# Patient Record
Sex: FEMALE | Race: WHITE
[De-identification: names, ages, dates, MRNs, and addresses within clinical notes are randomized per-mention and may not be internally consistent; named-entity substitution may affect disease eponyms.]

---

## 2018-09-06 ENCOUNTER — HOSPITAL ENCOUNTER (OUTPATIENT)
Age: 58
End: 2018-09-06
Payer: COMMERCIAL

## 2018-09-06 DIAGNOSIS — R31.9: Primary | ICD-10-CM

## 2018-09-06 PROCEDURE — 74178 CT ABD&PLV WO CNTR FLWD CNTR: CPT

## 2018-10-05 ENCOUNTER — HOSPITAL ENCOUNTER (OUTPATIENT)
Age: 58
End: 2018-10-05
Payer: COMMERCIAL

## 2018-10-05 DIAGNOSIS — Z12.31: Primary | ICD-10-CM

## 2018-10-05 PROCEDURE — 77067 SCR MAMMO BI INCL CAD: CPT

## 2018-10-05 PROCEDURE — 77063 BREAST TOMOSYNTHESIS BI: CPT

## 2018-10-23 ENCOUNTER — HOSPITAL ENCOUNTER (OUTPATIENT)
Age: 58
End: 2018-10-23
Payer: COMMERCIAL

## 2018-10-23 DIAGNOSIS — Z13.820: Primary | ICD-10-CM

## 2018-10-23 PROCEDURE — 77080 DXA BONE DENSITY AXIAL: CPT

## 2018-11-20 ENCOUNTER — HOSPITAL ENCOUNTER (OUTPATIENT)
Age: 58
End: 2018-11-20
Payer: COMMERCIAL

## 2018-11-20 DIAGNOSIS — M85.852: Primary | ICD-10-CM

## 2018-11-20 DIAGNOSIS — M85.851: ICD-10-CM

## 2018-11-20 LAB
ALANINE AMINOTRANSFER ALT/SGPT: 25 U/L (ref 13–56)
ALBUMIN SERPL-MCNC: 3.6 G/DL (ref 3.2–5)
ALKALINE PHOSPHATASE: 94 U/L (ref 45–117)
ANION GAP: 10 (ref 5–15)
AST(SGOT): 22 U/L (ref 15–37)
BUN SERPL-MCNC: 19 MG/DL (ref 7–18)
BUN/CREAT RATIO: 21 RATIO (ref 10–20)
CALCIUM SERPL-MCNC: 8.4 MG/DL (ref 8.5–10.1)
CARBON DIOXIDE: 24 MMOL/L (ref 21–32)
CHLORIDE: 108 MMOL/L (ref 98–107)
EST GLOM FILT RATE - AFR AMER: 82 ML/MIN (ref 60–?)
FREE T3: 2.6 PG/ML (ref 2.18–3.98)
GLOBULIN: 3.2 G/DL (ref 2.2–4.2)
GLUCOSE: 80 MG/DL (ref 74–106)
POTASSIUM: 3.8 MMOL/L (ref 3.5–5.1)
PTHIN: 90 PG/ML (ref 18.4–80.1)
VITAMIN D,25 HYDROXY: 23.2 NG/ML (ref 29.95–100.01)

## 2018-11-20 PROCEDURE — 83970 ASSAY OF PARATHORMONE: CPT

## 2018-11-20 PROCEDURE — 82306 VITAMIN D 25 HYDROXY: CPT

## 2018-11-20 PROCEDURE — 84165 PROTEIN E-PHORESIS SERUM: CPT

## 2018-11-20 PROCEDURE — 82652 VIT D 1 25-DIHYDROXY: CPT

## 2018-11-20 PROCEDURE — 36415 COLL VENOUS BLD VENIPUNCTURE: CPT

## 2018-11-20 PROCEDURE — 84439 ASSAY OF FREE THYROXINE: CPT

## 2018-11-20 PROCEDURE — 84481 FREE ASSAY (FT-3): CPT

## 2018-11-20 PROCEDURE — 84443 ASSAY THYROID STIM HORMONE: CPT

## 2018-11-20 PROCEDURE — 80053 COMPREHEN METABOLIC PANEL: CPT

## 2018-11-23 ENCOUNTER — HOSPITAL ENCOUNTER (OUTPATIENT)
Age: 58
End: 2018-11-23
Payer: COMMERCIAL

## 2018-11-23 DIAGNOSIS — M85.852: ICD-10-CM

## 2018-11-23 DIAGNOSIS — M85.851: Primary | ICD-10-CM

## 2018-11-23 LAB
ALBUMIN SERPL ELPH-MCNC: 4 G/DL (ref 2.9–4.4)
GLOBULIN SER-MCNC: 2.6 G/DL (ref 2.2–3.9)
PROEL- A/G RATIO: 1.5 (ref 0.7–1.7)
PROEL- ALPHA-1 GLOBULIN: 0.2 G/DL (ref 0–0.4)
PROEL- ALPHA-2 GLOBULIN: 0.7 G/DL (ref 0.4–1)
PROEL- BETA GLOBULIN: 1.1 G/DL (ref 0.7–1.3)
PROEL- GAMMA GLOBULIN: 0.5 G/DL (ref 0.4–1.8)
PROEL- TOTAL PROTEIN: 6.6 G/DL (ref 6–8.5)
PROT SERPL-MCNC: 6.6 G/DL (ref 6–8.5)

## 2018-11-23 PROCEDURE — 81050 URINALYSIS VOLUME MEASURE: CPT

## 2018-11-27 LAB — 1,25(OH)2D3 SERPL-MCNC: 53.7 PG/ML (ref 19.9–79.3)

## 2019-02-12 ENCOUNTER — HOSPITAL ENCOUNTER (OUTPATIENT)
Age: 59
End: 2019-02-12
Payer: COMMERCIAL

## 2019-02-12 DIAGNOSIS — Z79.52: Primary | ICD-10-CM

## 2019-02-12 LAB
ALANINE AMINOTRANSFER ALT/SGPT: 20 U/L (ref 13–56)
ALBUMIN SERPL-MCNC: 3.1 G/DL (ref 3.2–5)
ALKALINE PHOSPHATASE: 103 U/L (ref 45–117)
ANION GAP: 5 (ref 5–15)
AST(SGOT): 17 U/L (ref 15–37)
BUN SERPL-MCNC: 14 MG/DL (ref 7–18)
BUN/CREAT RATIO: 15.4 RATIO (ref 10–20)
CALCIUM SERPL-MCNC: 8.8 MG/DL (ref 8.5–10.1)
CARBON DIOXIDE: 28 MMOL/L (ref 21–32)
CHLORIDE: 106 MMOL/L (ref 98–107)
EST GLOM FILT RATE - AFR AMER: 81 ML/MIN (ref 60–?)
GLOBULIN: 3.8 G/DL (ref 2.2–4.2)
GLUCOSE: 88 MG/DL (ref 74–106)
POTASSIUM: 4 MMOL/L (ref 3.5–5.1)

## 2019-02-12 PROCEDURE — 80053 COMPREHEN METABOLIC PANEL: CPT

## 2019-02-12 PROCEDURE — 36415 COLL VENOUS BLD VENIPUNCTURE: CPT

## 2019-03-05 ENCOUNTER — HOSPITAL ENCOUNTER (OUTPATIENT)
Age: 59
End: 2019-03-05
Payer: COMMERCIAL

## 2019-03-05 DIAGNOSIS — J84.116: Primary | ICD-10-CM

## 2019-03-05 LAB — CRP SERPL-MCNC: 10.7 MG/L (ref 0–3)

## 2019-03-05 PROCEDURE — 36415 COLL VENOUS BLD VENIPUNCTURE: CPT

## 2019-03-05 PROCEDURE — 86140 C-REACTIVE PROTEIN: CPT

## 2019-03-05 PROCEDURE — 86141 C-REACTIVE PROTEIN HS: CPT

## 2019-03-05 PROCEDURE — 71046 X-RAY EXAM CHEST 2 VIEWS: CPT

## 2019-03-05 PROCEDURE — 85652 RBC SED RATE AUTOMATED: CPT

## 2019-03-06 LAB — CRP, HIGH SENSITIVITY CARDIAC: 10.2 MG/L

## 2019-03-07 ENCOUNTER — HOSPITAL ENCOUNTER (OUTPATIENT)
Age: 59
End: 2019-03-07
Payer: COMMERCIAL

## 2019-03-07 DIAGNOSIS — R91.8: ICD-10-CM

## 2019-03-07 DIAGNOSIS — J84.116: Primary | ICD-10-CM

## 2019-03-07 PROCEDURE — 71250 CT THORAX DX C-: CPT

## 2019-04-18 ENCOUNTER — HOSPITAL ENCOUNTER (OUTPATIENT)
Age: 59
End: 2019-04-18
Payer: COMMERCIAL

## 2019-04-18 DIAGNOSIS — I70.90: ICD-10-CM

## 2019-04-18 DIAGNOSIS — E78.5: ICD-10-CM

## 2019-04-18 DIAGNOSIS — R07.9: Primary | ICD-10-CM

## 2019-04-18 PROCEDURE — A9500 TC99M SESTAMIBI: HCPCS

## 2019-04-18 PROCEDURE — 78452 HT MUSCLE IMAGE SPECT MULT: CPT

## 2019-04-18 PROCEDURE — 93017 CV STRESS TEST TRACING ONLY: CPT

## 2019-04-18 PROCEDURE — A4216 STERILE WATER/SALINE, 10 ML: HCPCS

## 2019-04-22 ENCOUNTER — HOSPITAL ENCOUNTER (OUTPATIENT)
Dept: HOSPITAL 100 - MTLAB | Age: 59
Discharge: HOME | End: 2019-04-22
Payer: COMMERCIAL

## 2019-04-22 DIAGNOSIS — J84.116: Primary | ICD-10-CM

## 2019-04-22 LAB — CRP SERPL-MCNC: < 2.9 MG/L (ref 0–3)

## 2019-04-22 PROCEDURE — 86140 C-REACTIVE PROTEIN: CPT

## 2019-04-22 PROCEDURE — 36415 COLL VENOUS BLD VENIPUNCTURE: CPT

## 2019-04-22 PROCEDURE — 71046 X-RAY EXAM CHEST 2 VIEWS: CPT

## 2019-04-26 ENCOUNTER — HOSPITAL ENCOUNTER (OUTPATIENT)
Age: 59
Discharge: HOME | End: 2019-04-26
Payer: COMMERCIAL

## 2019-04-26 DIAGNOSIS — J84.116: Primary | ICD-10-CM

## 2019-04-26 LAB
ALANINE AMINOTRANSFER ALT/SGPT: 36 U/L (ref 13–56)
ALBUMIN SERPL-MCNC: 3.7 G/DL (ref 3.2–5)
ALKALINE PHOSPHATASE: 123 U/L (ref 45–117)
AST(SGOT): 28 U/L (ref 15–37)
BILIRUB DIRECT SERPL-MCNC: 0.07 MG/DL (ref 0–0.3)
DEPRECATED RDW RBC: 55.1 FL (ref 35.1–43.9)
ERYTHROCYTE [DISTWIDTH] IN BLOOD: 17.1 % (ref 11.6–14.6)
GLOBULIN: 3.2 G/DL (ref 2.2–4.2)
HCT VFR BLD AUTO: 43.9 % (ref 37–47)
HEMOGLOBIN: 13.3 G/DL (ref 12–15)
HGB BLD-MCNC: 13.3 G/DL (ref 12–15)
MCV RBC: 88 FL (ref 81–99)
MEAN CORP HGB CONC: 30.3 G/GL (ref 32–36)
MEAN PLATELET VOL.: 10 FL (ref 6.2–12)
PLATELET # BLD: 189 K/MM3 (ref 150–450)
PLATELET COUNT: 189 K/MM3 (ref 150–450)
RBC # BLD AUTO: 4.99 M/MM3 (ref 4.2–5.4)
RBC DISTRIBUTION WIDTH CV: 17.1 % (ref 11.6–14.6)
RBC DISTRIBUTION WIDTH SD: 55.1 FL (ref 35.1–43.9)
SCAN INDICATED ON CBC? Y/N: NO
WBC # BLD AUTO: 11.5 K/MM3 (ref 4.4–11)
WHITE BLOOD COUNT: 11.5 K/MM3 (ref 4.4–11)

## 2019-04-26 PROCEDURE — 85027 COMPLETE CBC AUTOMATED: CPT

## 2019-04-26 PROCEDURE — 36415 COLL VENOUS BLD VENIPUNCTURE: CPT

## 2019-04-26 PROCEDURE — 86480 TB TEST CELL IMMUN MEASURE: CPT

## 2019-04-26 PROCEDURE — 80076 HEPATIC FUNCTION PANEL: CPT

## 2019-05-17 ENCOUNTER — HOSPITAL ENCOUNTER (OUTPATIENT)
Dept: HOSPITAL 100 - LAB | Age: 59
Discharge: HOME | End: 2019-05-17
Payer: COMMERCIAL

## 2019-05-17 DIAGNOSIS — E21.3: Primary | ICD-10-CM

## 2019-05-17 DIAGNOSIS — E55.9: ICD-10-CM

## 2019-05-17 LAB
ALANINE AMINOTRANSFER ALT/SGPT: 36 U/L (ref 13–56)
ALBUMIN SERPL-MCNC: 3.5 G/DL (ref 3.2–5)
ALKALINE PHOSPHATASE: 102 U/L (ref 45–117)
ANION GAP: 5 (ref 5–15)
AST(SGOT): 27 U/L (ref 15–37)
BUN SERPL-MCNC: 23 MG/DL (ref 7–18)
BUN/CREAT RATIO: 25.4 RATIO (ref 10–20)
CALCIUM SERPL-MCNC: 8.6 MG/DL (ref 8.5–10.1)
CARBON DIOXIDE: 29 MMOL/L (ref 21–32)
CHLORIDE: 109 MMOL/L (ref 98–107)
EST GLOM FILT RATE - AFR AMER: 82 ML/MIN (ref 60–?)
GLOBULIN: 2.9 G/DL (ref 2.2–4.2)
GLUCOSE: 82 MG/DL (ref 74–106)
MAGNESIUM: 2.4 MG/DL (ref 1.6–2.6)
POTASSIUM: 3.7 MMOL/L (ref 3.5–5.1)
PTHIN: 124 PG/ML (ref 18.4–80.1)
VITAMIN D,25 HYDROXY: 57 NG/ML (ref 29.95–100.01)

## 2019-05-17 PROCEDURE — 82306 VITAMIN D 25 HYDROXY: CPT

## 2019-05-17 PROCEDURE — 36415 COLL VENOUS BLD VENIPUNCTURE: CPT

## 2019-05-17 PROCEDURE — 83735 ASSAY OF MAGNESIUM: CPT

## 2019-05-17 PROCEDURE — 83970 ASSAY OF PARATHORMONE: CPT

## 2019-05-17 PROCEDURE — 80053 COMPREHEN METABOLIC PANEL: CPT

## 2019-05-28 ENCOUNTER — HOSPITAL ENCOUNTER (OUTPATIENT)
Dept: HOSPITAL 100 - LAB | Age: 59
Discharge: HOME | End: 2019-05-28
Payer: COMMERCIAL

## 2019-05-28 DIAGNOSIS — J84.116: Primary | ICD-10-CM

## 2019-05-28 LAB
ALANINE AMINOTRANSFER ALT/SGPT: 33 U/L (ref 13–56)
ALBUMIN SERPL-MCNC: 3.3 G/DL (ref 3.2–5)
ALKALINE PHOSPHATASE: 83 U/L (ref 45–117)
AST(SGOT): 23 U/L (ref 15–37)
BILIRUB DIRECT SERPL-MCNC: 0.1 MG/DL (ref 0–0.3)
DEPRECATED RDW RBC: 54.5 FL (ref 35.1–43.9)
ERYTHROCYTE [DISTWIDTH] IN BLOOD: 17 % (ref 11.6–14.6)
GLOBULIN: 2.9 G/DL (ref 2.2–4.2)
HCT VFR BLD AUTO: 43.3 % (ref 37–47)
HEMOGLOBIN: 13.5 G/DL (ref 12–15)
HGB BLD-MCNC: 13.5 G/DL (ref 12–15)
MCV RBC: 87.5 FL (ref 81–99)
MEAN CORP HGB CONC: 31.2 G/GL (ref 32–36)
MEAN PLATELET VOL.: 10.1 FL (ref 6.2–12)
PLATELET # BLD: 161 K/MM3 (ref 150–450)
PLATELET COUNT: 161 K/MM3 (ref 150–450)
RBC # BLD AUTO: 4.95 M/MM3 (ref 4.2–5.4)
RBC DISTRIBUTION WIDTH CV: 17 % (ref 11.6–14.6)
RBC DISTRIBUTION WIDTH SD: 54.5 FL (ref 35.1–43.9)
SCAN INDICATED ON CBC? Y/N: NO
WBC # BLD AUTO: 12.7 K/MM3 (ref 4.4–11)
WHITE BLOOD COUNT: 12.7 K/MM3 (ref 4.4–11)

## 2019-05-28 PROCEDURE — 36415 COLL VENOUS BLD VENIPUNCTURE: CPT

## 2019-05-28 PROCEDURE — 85027 COMPLETE CBC AUTOMATED: CPT

## 2019-05-28 PROCEDURE — 80076 HEPATIC FUNCTION PANEL: CPT

## 2019-06-28 ENCOUNTER — HOSPITAL ENCOUNTER (OUTPATIENT)
Dept: HOSPITAL 100 - LAB | Age: 59
LOS: 2 days | Discharge: HOME | End: 2019-06-30
Payer: COMMERCIAL

## 2019-06-28 DIAGNOSIS — R07.9: ICD-10-CM

## 2019-06-28 DIAGNOSIS — J45.909: ICD-10-CM

## 2019-06-28 DIAGNOSIS — J84.116: Primary | ICD-10-CM

## 2019-06-28 LAB
ALANINE AMINOTRANSFER ALT/SGPT: 113 U/L (ref 13–56)
ALBUMIN SERPL-MCNC: 3.1 G/DL (ref 3.2–5)
ALKALINE PHOSPHATASE: 262 U/L (ref 45–117)
AST(SGOT): 51 U/L (ref 15–37)
BILIRUB DIRECT SERPL-MCNC: 0.08 MG/DL (ref 0–0.3)
BUN SERPL-MCNC: 19 MG/DL (ref 7–18)
DEPRECATED RDW RBC: 53.7 FL (ref 35.1–43.9)
DIFFERENTIAL COMMENT: (no result)
DIFFERENTIAL INDICATED: (no result)
ERYTHROCYTE [DISTWIDTH] IN BLOOD: 16.1 % (ref 11.6–14.6)
EST GLOM FILT RATE - AFR AMER: 85 ML/MIN (ref 60–?)
GLOBULIN: 3.4 G/DL (ref 2.2–4.2)
HCT VFR BLD AUTO: 41.6 % (ref 37–47)
HEMOGLOBIN: 12.7 G/DL (ref 12–15)
HGB BLD-MCNC: 12.7 G/DL (ref 12–15)
IMMATURE GRANULOCYTES COUNT: 0 X10^3/UL (ref 0–0)
MANUAL DIF COMMENT BLD-IMP: (no result)
MCV RBC: 90.2 FL (ref 81–99)
MEAN CORP HGB CONC: 30.5 G/GL (ref 32–36)
MEAN PLATELET VOL.: 9.6 FL (ref 6.2–12)
PLATELET # BLD: 237 K/MM3 (ref 150–450)
PLATELET COUNT: 237 K/MM3 (ref 150–450)
POSITIVE COUNT: NO
POSITIVE DIFFERENTIAL: YES
POSITIVE MORPHOLOGY: NO
RBC # BLD AUTO: 4.61 M/MM3 (ref 4.2–5.4)
RBC DISTRIBUTION WIDTH CV: 16.1 % (ref 11.6–14.6)
RBC DISTRIBUTION WIDTH SD: 53.7 FL (ref 35.1–43.9)
SCAN SMEAR PER REVIEW CRITERIA: (no result)
WBC # BLD AUTO: 2.1 K/MM3 (ref 4.4–11)
WHITE BLOOD COUNT: 2.1 K/MM3 (ref 4.4–11)

## 2019-06-28 PROCEDURE — 85025 COMPLETE CBC W/AUTO DIFF WBC: CPT

## 2019-06-28 PROCEDURE — 82565 ASSAY OF CREATININE: CPT

## 2019-06-28 PROCEDURE — 36415 COLL VENOUS BLD VENIPUNCTURE: CPT

## 2019-06-28 PROCEDURE — 84520 ASSAY OF UREA NITROGEN: CPT

## 2019-06-28 PROCEDURE — 80076 HEPATIC FUNCTION PANEL: CPT

## 2019-07-05 LAB
CELLS COUNTED: 100
DEPRECATED RDW RBC: 51.6 FL (ref 35.1–43.9)
DIFFERENTIAL INDICATED: MANUAL DIFF
ERYTHROCYTE [DISTWIDTH] IN BLOOD: 15.9 % (ref 11.6–14.6)
HCT VFR BLD AUTO: 43.1 % (ref 37–47)
HEMOGLOBIN: 13.4 G/DL (ref 12–15)
HGB BLD-MCNC: 13.4 G/DL (ref 12–15)
MCV RBC: 88.7 FL (ref 81–99)
MEAN CORP HGB CONC: 31.1 G/GL (ref 32–36)
MEAN PLATELET VOL.: 9.4 FL (ref 6.2–12)
NEUTROPHIL-BAND: 11 % (ref 0–5)
NEUTROPHIL-SEGMENTED: 32 % (ref 47–70)
NUCLEATED RED BLD CELLS,MANUAL: 1 % (ref 0–5)
PLATELET # BLD: 221 K/MM3 (ref 150–450)
PLATELET COUNT: 221 K/MM3 (ref 150–450)
POSITIVE COUNT: YES
POSITIVE DIFFERENTIAL: NO
POSITIVE MORPHOLOGY: YES
RBC # BLD AUTO: 4.86 M/MM3 (ref 4.2–5.4)
RBC DISTRIBUTION WIDTH CV: 15.9 % (ref 11.6–14.6)
RBC DISTRIBUTION WIDTH SD: 51.6 FL (ref 35.1–43.9)
TOTAL CELLS COUNTED: 100
WBC # BLD AUTO: 7 K/MM3 (ref 4.4–11)
WHITE BLOOD COUNT: 7 K/MM3 (ref 4.4–11)

## 2019-07-15 ENCOUNTER — HOSPITAL ENCOUNTER (OUTPATIENT)
Dept: HOSPITAL 100 - LAB | Age: 59
Discharge: HOME | End: 2019-07-15
Payer: COMMERCIAL

## 2019-07-15 DIAGNOSIS — R07.9: ICD-10-CM

## 2019-07-15 DIAGNOSIS — J84.116: Primary | ICD-10-CM

## 2019-07-15 DIAGNOSIS — J45.909: ICD-10-CM

## 2019-07-15 LAB
ALANINE AMINOTRANSFER ALT/SGPT: 145 U/L (ref 13–56)
ALBUMIN SERPL-MCNC: 3.2 G/DL (ref 3.2–5)
ALKALINE PHOSPHATASE: 172 U/L (ref 45–117)
AST(SGOT): 35 U/L (ref 15–37)
BILIRUB DIRECT SERPL-MCNC: 0.08 MG/DL (ref 0–0.3)
BUN SERPL-MCNC: 15 MG/DL (ref 7–18)
DEPRECATED RDW RBC: 52.9 FL (ref 35.1–43.9)
DIFFERENTIAL INDICATED: (no result)
ERYTHROCYTE [DISTWIDTH] IN BLOOD: 16.1 % (ref 11.6–14.6)
GLOBULIN: 3 G/DL (ref 2.2–4.2)
HCT VFR BLD AUTO: 42.5 % (ref 37–47)
HEMOGLOBIN: 13.1 G/DL (ref 12–15)
HGB BLD-MCNC: 13.1 G/DL (ref 12–15)
IMMATURE GRANULOCYTES COUNT: 0.11 X10^3/UL (ref 0–0)
MCV RBC: 90 FL (ref 81–99)
MEAN CORP HGB CONC: 30.8 G/GL (ref 32–36)
MEAN PLATELET VOL.: 10.4 FL (ref 6.2–12)
PLATELET # BLD: 154 K/MM3 (ref 150–450)
PLATELET COUNT: 154 K/MM3 (ref 150–450)
POSITIVE COUNT: NO
POSITIVE DIFFERENTIAL: NO
POSITIVE MORPHOLOGY: NO
RBC # BLD AUTO: 4.72 M/MM3 (ref 4.2–5.4)
RBC DISTRIBUTION WIDTH CV: 16.1 % (ref 11.6–14.6)
RBC DISTRIBUTION WIDTH SD: 52.9 FL (ref 35.1–43.9)
WBC # BLD AUTO: 10.9 K/MM3 (ref 4.4–11)
WHITE BLOOD COUNT: 10.9 K/MM3 (ref 4.4–11)

## 2019-07-15 PROCEDURE — 85652 RBC SED RATE AUTOMATED: CPT

## 2019-07-15 PROCEDURE — 84520 ASSAY OF UREA NITROGEN: CPT

## 2019-07-15 PROCEDURE — 80076 HEPATIC FUNCTION PANEL: CPT

## 2019-07-15 PROCEDURE — 36415 COLL VENOUS BLD VENIPUNCTURE: CPT

## 2019-07-15 PROCEDURE — 85025 COMPLETE CBC W/AUTO DIFF WBC: CPT

## 2019-07-31 ENCOUNTER — HOSPITAL ENCOUNTER (OUTPATIENT)
Dept: HOSPITAL 100 - LAB | Age: 59
Discharge: HOME | End: 2019-07-31
Payer: COMMERCIAL

## 2019-07-31 DIAGNOSIS — D72.819: ICD-10-CM

## 2019-07-31 DIAGNOSIS — J84.116: ICD-10-CM

## 2019-07-31 DIAGNOSIS — Z79.899: Primary | ICD-10-CM

## 2019-07-31 LAB
ALANINE AMINOTRANSFER ALT/SGPT: 34 U/L (ref 13–56)
ALBUMIN SERPL-MCNC: 3.5 G/DL (ref 3.2–5)
ALKALINE PHOSPHATASE: 78 U/L (ref 45–117)
AST(SGOT): 28 U/L (ref 15–37)
BILIRUB DIRECT SERPL-MCNC: 0.12 MG/DL (ref 0–0.3)
BUN SERPL-MCNC: 20 MG/DL (ref 7–18)
DEPRECATED RDW RBC: 53.1 FL (ref 35.1–43.9)
ERYTHROCYTE [DISTWIDTH] IN BLOOD: 15.9 % (ref 11.6–14.6)
EST GLOM FILT RATE - AFR AMER: 77 ML/MIN (ref 60–?)
GLOBULIN: 2.9 G/DL (ref 2.2–4.2)
HCT VFR BLD AUTO: 44.3 % (ref 37–47)
HEMOGLOBIN: 13.7 G/DL (ref 12–15)
HGB BLD-MCNC: 13.7 G/DL (ref 12–15)
IMMATURE GRANULOCYTES COUNT: 0.07 X10^3/UL (ref 0–0)
MCV RBC: 91 FL (ref 81–99)
MEAN CORP HGB CONC: 30.9 G/DL (ref 32–36)
MEAN PLATELET VOL.: 10.2 FL (ref 6.2–12)
NRBC FLAGGED BY ANALYZER: 0 % (ref 0–5)
PLATELET # BLD: 215 K/MM3 (ref 150–450)
PLATELET COUNT: 215 K/MM3 (ref 150–450)
RBC # BLD AUTO: 4.87 M/MM3 (ref 4.2–5.4)
RBC DISTRIBUTION WIDTH CV: 15.9 % (ref 11.6–14.6)
RBC DISTRIBUTION WIDTH SD: 53.1 FL (ref 35.1–43.9)
WBC # BLD AUTO: 8.5 K/MM3 (ref 4.4–11)
WHITE BLOOD COUNT: 8.5 K/MM3 (ref 4.4–11)

## 2019-07-31 PROCEDURE — 82565 ASSAY OF CREATININE: CPT

## 2019-07-31 PROCEDURE — 84520 ASSAY OF UREA NITROGEN: CPT

## 2019-07-31 PROCEDURE — 71046 X-RAY EXAM CHEST 2 VIEWS: CPT

## 2019-07-31 PROCEDURE — 36415 COLL VENOUS BLD VENIPUNCTURE: CPT

## 2019-07-31 PROCEDURE — 80076 HEPATIC FUNCTION PANEL: CPT

## 2019-07-31 PROCEDURE — 85025 COMPLETE CBC W/AUTO DIFF WBC: CPT

## 2019-09-06 ENCOUNTER — HOSPITAL ENCOUNTER (OUTPATIENT)
Age: 59
End: 2019-09-06
Payer: COMMERCIAL

## 2019-09-06 DIAGNOSIS — L29.8: Primary | ICD-10-CM

## 2019-09-06 LAB
ALANINE AMINOTRANSFER ALT/SGPT: 30 U/L (ref 13–56)
ALBUMIN SERPL-MCNC: 3.4 G/DL (ref 3.2–5)
ALKALINE PHOSPHATASE: 98 U/L (ref 45–117)
ANION GAP: 7 (ref 5–15)
AST(SGOT): 26 U/L (ref 15–37)
BILIRUB DIRECT SERPL-MCNC: < 0.05 MG/DL (ref 0–0.3)
BUN SERPL-MCNC: 14 MG/DL (ref 7–18)
BUN/CREAT RATIO: 15.2 RATIO (ref 10–20)
CALCIUM SERPL-MCNC: 8.6 MG/DL (ref 8.5–10.1)
CARBON DIOXIDE: 29 MMOL/L (ref 21–32)
CHLORIDE: 106 MMOL/L (ref 98–107)
DEPRECATED RDW RBC: 53.1 FL (ref 35.1–43.9)
ERYTHROCYTE [DISTWIDTH] IN BLOOD: 15.4 % (ref 11.6–14.6)
EST GLOM FILT RATE - AFR AMER: 80 ML/MIN (ref 60–?)
FT4I SERPL CALC-MCNC: 2.7 (ref 1.4–4.5)
GLOBULIN: 3.3 G/DL (ref 2.2–4.2)
GLUCOSE: 103 MG/DL (ref 74–106)
HCT VFR BLD AUTO: 45.2 % (ref 37–47)
HEMOGLOBIN: 13.7 G/DL (ref 12–15)
HGB BLD-MCNC: 13.7 G/DL (ref 12–15)
IMMATURE GRANULOCYTES COUNT: 0.14 X10^3/UL (ref 0–0)
MCV RBC: 92.6 FL (ref 81–99)
MEAN CORP HGB CONC: 30.3 G/DL (ref 32–36)
MEAN PLATELET VOL.: 10 FL (ref 6.2–12)
NRBC FLAGGED BY ANALYZER: 0 % (ref 0–5)
PLATELET # BLD: 170 K/MM3 (ref 150–450)
PLATELET COUNT: 170 K/MM3 (ref 150–450)
POTASSIUM: 3.6 MMOL/L (ref 3.5–5.1)
RBC # BLD AUTO: 4.88 M/MM3 (ref 4.2–5.4)
RBC DISTRIBUTION WIDTH CV: 15.4 % (ref 11.6–14.6)
RBC DISTRIBUTION WIDTH SD: 53.1 FL (ref 35.1–43.9)
T4 SERPL-MCNC: 7.4 UG/DL (ref 4.8–13.9)
WBC # BLD AUTO: 12.4 K/MM3 (ref 4.4–11)
WHITE BLOOD COUNT: 12.4 K/MM3 (ref 4.4–11)

## 2019-09-06 PROCEDURE — 36415 COLL VENOUS BLD VENIPUNCTURE: CPT

## 2019-09-06 PROCEDURE — 80048 BASIC METABOLIC PNL TOTAL CA: CPT

## 2019-09-06 PROCEDURE — 82784 ASSAY IGA/IGD/IGG/IGM EACH: CPT

## 2019-09-06 PROCEDURE — 86334 IMMUNOFIX E-PHORESIS SERUM: CPT

## 2019-09-06 PROCEDURE — 86703 HIV-1/HIV-2 1 RESULT ANTBDY: CPT

## 2019-09-06 PROCEDURE — 83516 IMMUNOASSAY NONANTIBODY: CPT

## 2019-09-06 PROCEDURE — 84165 PROTEIN E-PHORESIS SERUM: CPT

## 2019-09-06 PROCEDURE — 84166 PROTEIN E-PHORESIS/URINE/CSF: CPT

## 2019-09-06 PROCEDURE — 80076 HEPATIC FUNCTION PANEL: CPT

## 2019-09-06 PROCEDURE — 84443 ASSAY THYROID STIM HORMONE: CPT

## 2019-09-06 PROCEDURE — 85025 COMPLETE CBC W/AUTO DIFF WBC: CPT

## 2019-09-06 PROCEDURE — 86235 NUCLEAR ANTIGEN ANTIBODY: CPT

## 2019-09-06 PROCEDURE — 82785 ASSAY OF IGE: CPT

## 2019-09-06 PROCEDURE — 84436 ASSAY OF TOTAL THYROXINE: CPT

## 2019-09-06 PROCEDURE — 84479 ASSAY OF THYROID (T3 OR T4): CPT

## 2019-09-06 PROCEDURE — 86038 ANTINUCLEAR ANTIBODIES: CPT

## 2019-09-06 PROCEDURE — 84439 ASSAY OF FREE THYROXINE: CPT

## 2019-09-06 PROCEDURE — 86803 HEPATITIS C AB TEST: CPT

## 2019-09-09 LAB
SJOGREN'S ANTI-SS-A TEST: < 0.2 AI (ref 0–0.9)
SJOGREN'S ANTI-SS-B TEST: < 0.2 AI (ref 0–0.9)

## 2019-09-10 LAB
ALBUMIN SERPL ELPH-MCNC: 3.6 G/DL (ref 2.9–4.4)
ALPHA1 GLOB UR ELPH-MCNC: 8.4 %
B-GLOBULIN 24H MFR UR ELPH: 24.2 %
GLOBULIN SER-MCNC: 2.5 G/DL (ref 2.2–3.9)
IGA SERPL-MCNC: 148 MG/DL (ref 87–352)
IGG SERPL-MCNC: 545 MG/DL (ref 700–1600)
IGM SERPL-MCNC: 81 MG/DL (ref 26–217)
IMMUNOGLOBULIN A: 148 MG/DL (ref 87–352)
IMMUNOGLOBULIN G: 545 MG/DL (ref 700–1600)
IMMUNOGLOBULIN M: 81 MG/DL (ref 26–217)
PROEL- A/G RATIO: 1.4 (ref 0.7–1.7)
PROEL- ALPHA-1 GLOBULIN: 0.2 G/DL (ref 0–0.4)
PROEL- ALPHA-2 GLOBULIN: 0.7 G/DL (ref 0.4–1)
PROEL- BETA GLOBULIN: 1 G/DL (ref 0.7–1.3)
PROEL- GAMMA GLOBULIN: 0.5 G/DL (ref 0.4–1.8)
PROEL- TOTAL PROTEIN: 6.1 G/DL (ref 6–8.5)
PROELU- ALBUMIN, URINE: 35.4 %
PROELU- ALPHA-2-GLOBULIN,UR: 23.6 %
PROELU- GAMMA GLOBULIN, UR: 8.4 %
PROT SERPL-MCNC: 6.1 G/DL (ref 6–8.5)
PROT UR-MCNC: 7.1 MG/DL

## 2019-10-07 ENCOUNTER — HOSPITAL ENCOUNTER (OUTPATIENT)
Dept: HOSPITAL 100 - OPBI | Age: 59
Discharge: HOME | End: 2019-10-07
Payer: COMMERCIAL

## 2019-10-07 DIAGNOSIS — Z12.31: Primary | ICD-10-CM

## 2019-10-07 PROCEDURE — 77063 BREAST TOMOSYNTHESIS BI: CPT

## 2019-10-07 PROCEDURE — 77067 SCR MAMMO BI INCL CAD: CPT

## 2019-10-09 ENCOUNTER — HOSPITAL ENCOUNTER (OUTPATIENT)
Age: 59
End: 2019-10-09
Payer: COMMERCIAL

## 2019-10-09 DIAGNOSIS — M25.551: Primary | ICD-10-CM

## 2019-10-09 PROCEDURE — 72110 X-RAY EXAM L-2 SPINE 4/>VWS: CPT

## 2019-10-09 PROCEDURE — 73502 X-RAY EXAM HIP UNI 2-3 VIEWS: CPT

## 2019-11-01 ENCOUNTER — HOSPITAL ENCOUNTER (OUTPATIENT)
Age: 59
End: 2019-11-01
Payer: COMMERCIAL

## 2019-11-01 DIAGNOSIS — M81.0: ICD-10-CM

## 2019-11-01 DIAGNOSIS — E21.3: Primary | ICD-10-CM

## 2019-11-01 DIAGNOSIS — E55.9: ICD-10-CM

## 2019-11-01 LAB
ALANINE AMINOTRANSFER ALT/SGPT: 31 U/L (ref 13–56)
ALBUMIN SERPL-MCNC: 3.4 G/DL (ref 3.2–5)
ALKALINE PHOSPHATASE: 92 U/L (ref 45–117)
ANION GAP: 9 (ref 5–15)
AST(SGOT): 24 U/L (ref 15–37)
BUN SERPL-MCNC: 18 MG/DL (ref 7–18)
BUN/CREAT RATIO: 17.5 RATIO (ref 10–20)
CALCIUM SERPL-MCNC: 8.8 MG/DL (ref 8.5–10.1)
CARBON DIOXIDE: 26 MMOL/L (ref 21–32)
CHLORIDE: 106 MMOL/L (ref 98–107)
EST GLOM FILT RATE - AFR AMER: 70 ML/MIN (ref 60–?)
GLOBULIN: 3.2 G/DL (ref 2.2–4.2)
GLUCOSE: 136 MG/DL (ref 74–106)
POTASSIUM: 3.6 MMOL/L (ref 3.5–5.1)
PTHIN: 59.6 PG/ML (ref 18.4–80.1)
VITAMIN D,25 HYDROXY: 68.1 NG/ML (ref 29.95–100.01)

## 2019-11-01 PROCEDURE — 82330 ASSAY OF CALCIUM: CPT

## 2019-11-01 PROCEDURE — 36415 COLL VENOUS BLD VENIPUNCTURE: CPT

## 2019-11-01 PROCEDURE — 80053 COMPREHEN METABOLIC PANEL: CPT

## 2019-11-01 PROCEDURE — 84100 ASSAY OF PHOSPHORUS: CPT

## 2019-11-01 PROCEDURE — 82306 VITAMIN D 25 HYDROXY: CPT

## 2019-11-01 PROCEDURE — 83970 ASSAY OF PARATHORMONE: CPT

## 2019-11-26 ENCOUNTER — HOSPITAL ENCOUNTER (OUTPATIENT)
Dept: HOSPITAL 100 - LAB | Age: 59
Discharge: HOME | End: 2019-11-26
Payer: COMMERCIAL

## 2019-11-26 DIAGNOSIS — J45.909: ICD-10-CM

## 2019-11-26 DIAGNOSIS — J84.116: Primary | ICD-10-CM

## 2019-11-26 LAB
ALANINE AMINOTRANSFER ALT/SGPT: 29 U/L (ref 13–56)
ALBUMIN SERPL-MCNC: 3.3 G/DL (ref 3.2–5)
ALKALINE PHOSPHATASE: 89 U/L (ref 45–117)
AST(SGOT): 27 U/L (ref 15–37)
BILIRUB DIRECT SERPL-MCNC: 0.1 MG/DL (ref 0–0.3)
BUN SERPL-MCNC: 20 MG/DL (ref 7–18)
DEPRECATED RDW RBC: 52.4 FL (ref 35.1–43.9)
ERYTHROCYTE [DISTWIDTH] IN BLOOD: 15.4 % (ref 11.6–14.6)
GLOBULIN: 3 G/DL (ref 2.2–4.2)
HCT VFR BLD AUTO: 42.8 % (ref 37–47)
HEMOGLOBIN: 13.1 G/DL (ref 12–15)
HGB BLD-MCNC: 13.1 G/DL (ref 12–15)
MCV RBC: 92 FL (ref 81–99)
MEAN CORP HGB CONC: 30.6 G/DL (ref 32–36)
MEAN PLATELET VOL.: 10 FL (ref 6.2–12)
PLATELET # BLD: 194 K/MM3 (ref 150–450)
PLATELET COUNT: 194 K/MM3 (ref 150–450)
RBC # BLD AUTO: 4.65 M/MM3 (ref 4.2–5.4)
RBC DISTRIBUTION WIDTH CV: 15.4 % (ref 11.6–14.6)
RBC DISTRIBUTION WIDTH SD: 52.4 FL (ref 35.1–43.9)
WBC # BLD AUTO: 10.3 K/MM3 (ref 4.4–11)
WHITE BLOOD COUNT: 10.3 K/MM3 (ref 4.4–11)

## 2019-11-26 PROCEDURE — 85027 COMPLETE CBC AUTOMATED: CPT

## 2019-11-26 PROCEDURE — 84520 ASSAY OF UREA NITROGEN: CPT

## 2019-11-26 PROCEDURE — 80076 HEPATIC FUNCTION PANEL: CPT

## 2019-11-26 PROCEDURE — 36415 COLL VENOUS BLD VENIPUNCTURE: CPT

## 2019-12-09 ENCOUNTER — HOSPITAL ENCOUNTER (OUTPATIENT)
Dept: HOSPITAL 100 - LAB | Age: 59
Discharge: HOME | End: 2019-12-09
Payer: COMMERCIAL

## 2019-12-09 DIAGNOSIS — J84.116: Primary | ICD-10-CM

## 2019-12-09 DIAGNOSIS — J45.909: ICD-10-CM

## 2019-12-09 LAB
ALANINE AMINOTRANSFER ALT/SGPT: 27 U/L (ref 13–56)
ALBUMIN SERPL-MCNC: 3.3 G/DL (ref 3.2–5)
ALKALINE PHOSPHATASE: 78 U/L (ref 45–117)
AST(SGOT): 22 U/L (ref 15–37)
BILIRUB DIRECT SERPL-MCNC: 0.08 MG/DL (ref 0–0.3)
BUN SERPL-MCNC: 17 MG/DL (ref 7–18)
DEPRECATED RDW RBC: 51.3 FL (ref 35.1–43.9)
ERYTHROCYTE [DISTWIDTH] IN BLOOD: 14.9 % (ref 11.6–14.6)
GLOBULIN: 2.9 G/DL (ref 2.2–4.2)
HCT VFR BLD AUTO: 44.6 % (ref 37–47)
HEMOGLOBIN: 13.6 G/DL (ref 12–15)
HGB BLD-MCNC: 13.6 G/DL (ref 12–15)
MCV RBC: 92.1 FL (ref 81–99)
MEAN CORP HGB CONC: 30.5 G/DL (ref 32–36)
MEAN PLATELET VOL.: 10.1 FL (ref 6.2–12)
PLATELET # BLD: 167 K/MM3 (ref 150–450)
PLATELET COUNT: 167 K/MM3 (ref 150–450)
RBC # BLD AUTO: 4.84 M/MM3 (ref 4.2–5.4)
RBC DISTRIBUTION WIDTH CV: 14.9 % (ref 11.6–14.6)
RBC DISTRIBUTION WIDTH SD: 51.3 FL (ref 35.1–43.9)
WBC # BLD AUTO: 10.2 K/MM3 (ref 4.4–11)
WHITE BLOOD COUNT: 10.2 K/MM3 (ref 4.4–11)

## 2019-12-09 PROCEDURE — 80076 HEPATIC FUNCTION PANEL: CPT

## 2019-12-09 PROCEDURE — 36415 COLL VENOUS BLD VENIPUNCTURE: CPT

## 2019-12-09 PROCEDURE — 84520 ASSAY OF UREA NITROGEN: CPT

## 2019-12-09 PROCEDURE — 85027 COMPLETE CBC AUTOMATED: CPT

## 2019-12-26 ENCOUNTER — HOSPITAL ENCOUNTER (OUTPATIENT)
Age: 59
End: 2019-12-26
Payer: COMMERCIAL

## 2019-12-26 DIAGNOSIS — M81.0: Primary | ICD-10-CM

## 2019-12-26 PROCEDURE — 77080 DXA BONE DENSITY AXIAL: CPT

## 2020-02-03 ENCOUNTER — HOSPITAL ENCOUNTER (OUTPATIENT)
Dept: HOSPITAL 100 - LAB | Age: 60
Discharge: HOME | End: 2020-02-03
Payer: COMMERCIAL

## 2020-02-03 DIAGNOSIS — J84.116: Primary | ICD-10-CM

## 2020-02-03 DIAGNOSIS — J45.909: ICD-10-CM

## 2020-02-03 LAB
ALANINE AMINOTRANSFER ALT/SGPT: 31 U/L (ref 13–56)
ALBUMIN SERPL-MCNC: 3.5 G/DL (ref 3.2–5)
ALKALINE PHOSPHATASE: 86 U/L (ref 45–117)
AST(SGOT): 27 U/L (ref 15–37)
BILIRUB DIRECT SERPL-MCNC: 0.06 MG/DL (ref 0–0.3)
BUN SERPL-MCNC: 16 MG/DL (ref 7–18)
DEPRECATED RDW RBC: 51.2 FL (ref 35.1–43.9)
ERYTHROCYTE [DISTWIDTH] IN BLOOD: 15.2 % (ref 11.6–14.6)
GLOBULIN: 3.1 G/DL (ref 2.2–4.2)
HCT VFR BLD AUTO: 44.6 % (ref 37–47)
HEMOGLOBIN: 13.2 G/DL (ref 12–15)
HGB BLD-MCNC: 13.2 G/DL (ref 12–15)
MCV RBC: 91.8 FL (ref 81–99)
MEAN CORP HGB CONC: 29.6 G/DL (ref 32–36)
MEAN PLATELET VOL.: 10.2 FL (ref 6.2–12)
PLATELET # BLD: 227 K/MM3 (ref 150–450)
PLATELET COUNT: 227 K/MM3 (ref 150–450)
RBC # BLD AUTO: 4.86 M/MM3 (ref 4.2–5.4)
RBC DISTRIBUTION WIDTH CV: 15.2 % (ref 11.6–14.6)
RBC DISTRIBUTION WIDTH SD: 51.2 FL (ref 35.1–43.9)
WBC # BLD AUTO: 8.5 K/MM3 (ref 4.4–11)
WHITE BLOOD COUNT: 8.5 K/MM3 (ref 4.4–11)

## 2020-02-03 PROCEDURE — 84520 ASSAY OF UREA NITROGEN: CPT

## 2020-02-03 PROCEDURE — 36415 COLL VENOUS BLD VENIPUNCTURE: CPT

## 2020-02-03 PROCEDURE — 80076 HEPATIC FUNCTION PANEL: CPT

## 2020-02-03 PROCEDURE — 85027 COMPLETE CBC AUTOMATED: CPT

## 2020-03-03 ENCOUNTER — HOSPITAL ENCOUNTER (OUTPATIENT)
Dept: HOSPITAL 100 - LAB | Age: 60
Discharge: HOME | End: 2020-03-03
Payer: COMMERCIAL

## 2020-03-03 DIAGNOSIS — J45.909: ICD-10-CM

## 2020-03-03 DIAGNOSIS — J84.116: Primary | ICD-10-CM

## 2020-03-03 LAB
ALANINE AMINOTRANSFER ALT/SGPT: 33 U/L (ref 13–56)
ALBUMIN SERPL-MCNC: 3.4 G/DL (ref 3.2–5)
ALKALINE PHOSPHATASE: 66 U/L (ref 45–117)
AST(SGOT): 27 U/L (ref 15–37)
BILIRUB DIRECT SERPL-MCNC: 0.1 MG/DL (ref 0–0.3)
BUN SERPL-MCNC: 16 MG/DL (ref 7–18)
CRP SERPL-MCNC: < 2.9 MG/L (ref 0–3)
DEPRECATED RDW RBC: 51.2 FL (ref 35.1–43.9)
ERYTHROCYTE [DISTWIDTH] IN BLOOD: 15.7 % (ref 11.6–14.6)
GLOBULIN: 3.1 G/DL (ref 2.2–4.2)
HCT VFR BLD AUTO: 45.3 % (ref 37–47)
HEMOGLOBIN: 13.9 G/DL (ref 12–15)
HGB BLD-MCNC: 13.9 G/DL (ref 12–15)
MCV RBC: 91.3 FL (ref 81–99)
MEAN CORP HGB CONC: 30.7 G/DL (ref 32–36)
MEAN PLATELET VOL.: 10.1 FL (ref 6.2–12)
PLATELET # BLD: 180 K/MM3 (ref 150–450)
PLATELET COUNT: 180 K/MM3 (ref 150–450)
RBC # BLD AUTO: 4.96 M/MM3 (ref 4.2–5.4)
RBC DISTRIBUTION WIDTH CV: 15.7 % (ref 11.6–14.6)
RBC DISTRIBUTION WIDTH SD: 51.2 FL (ref 35.1–43.9)
WBC # BLD AUTO: 10.6 K/MM3 (ref 4.4–11)
WHITE BLOOD COUNT: 10.6 K/MM3 (ref 4.4–11)

## 2020-03-03 PROCEDURE — 36415 COLL VENOUS BLD VENIPUNCTURE: CPT

## 2020-03-03 PROCEDURE — 80076 HEPATIC FUNCTION PANEL: CPT

## 2020-03-03 PROCEDURE — 84520 ASSAY OF UREA NITROGEN: CPT

## 2020-03-03 PROCEDURE — 85652 RBC SED RATE AUTOMATED: CPT

## 2020-03-03 PROCEDURE — 85027 COMPLETE CBC AUTOMATED: CPT

## 2020-03-03 PROCEDURE — 86140 C-REACTIVE PROTEIN: CPT

## 2020-03-27 ENCOUNTER — HOSPITAL ENCOUNTER (OUTPATIENT)
Age: 60
End: 2020-03-27
Payer: COMMERCIAL

## 2020-03-27 DIAGNOSIS — M79.10: Primary | ICD-10-CM

## 2020-03-27 DIAGNOSIS — M25.50: ICD-10-CM

## 2020-03-27 PROCEDURE — 36415 COLL VENOUS BLD VENIPUNCTURE: CPT

## 2020-03-31 ENCOUNTER — HOSPITAL ENCOUNTER (OUTPATIENT)
Age: 60
End: 2020-03-31
Payer: COMMERCIAL

## 2020-03-31 DIAGNOSIS — J84.116: Primary | ICD-10-CM

## 2020-03-31 DIAGNOSIS — R05: ICD-10-CM

## 2020-03-31 DIAGNOSIS — R50.9: ICD-10-CM

## 2020-03-31 DIAGNOSIS — R07.9: ICD-10-CM

## 2020-03-31 LAB
DEPRECATED RDW RBC: 53.1 FL (ref 35.1–43.9)
ERYTHROCYTE [DISTWIDTH] IN BLOOD: 16.6 % (ref 11.6–14.6)
HCT VFR BLD AUTO: 42 % (ref 37–47)
HEMOGLOBIN: 12.6 G/DL (ref 12–15)
HGB BLD-MCNC: 12.6 G/DL (ref 12–15)
IMMATURE GRANULOCYTES COUNT: 0.07 X10^3/UL (ref 0–0)
MCV RBC: 88.2 FL (ref 81–99)
MEAN CORP HGB CONC: 30 G/DL (ref 32–36)
MEAN PLATELET VOL.: 10.8 FL (ref 6.2–12)
NRBC FLAGGED BY ANALYZER: 0 % (ref 0–5)
PLATELET # BLD: 102 K/MM3 (ref 150–450)
PLATELET COUNT: 102 K/MM3 (ref 150–450)
RBC # BLD AUTO: 4.76 M/MM3 (ref 4.2–5.4)
RBC DISTRIBUTION WIDTH CV: 16.6 % (ref 11.6–14.6)
RBC DISTRIBUTION WIDTH SD: 53.1 FL (ref 35.1–43.9)
WBC # BLD AUTO: 3.5 K/MM3 (ref 4.4–11)
WHITE BLOOD COUNT: 3.5 K/MM3 (ref 4.4–11)

## 2020-03-31 PROCEDURE — 36415 COLL VENOUS BLD VENIPUNCTURE: CPT

## 2020-03-31 PROCEDURE — 85652 RBC SED RATE AUTOMATED: CPT

## 2020-03-31 PROCEDURE — 85025 COMPLETE CBC W/AUTO DIFF WBC: CPT

## 2020-03-31 PROCEDURE — 71046 X-RAY EXAM CHEST 2 VIEWS: CPT

## 2020-04-01 ENCOUNTER — HOSPITAL ENCOUNTER (OUTPATIENT)
Age: 60
End: 2020-04-01
Payer: COMMERCIAL

## 2020-04-01 DIAGNOSIS — R30.0: Primary | ICD-10-CM

## 2020-04-01 LAB
LEUKOCYTE ESTERASE UR QL STRIP: 100 /UL
MUCOUS THREADS URNS QL MICRO: (no result) /HPF
PROT UR QL STRIP.AUTO: 30 MG/DL
RBC UR QL: (no result) /HPF (ref 0–5)
RBC UR QL: 10 /UL
SP GR UR: 1.02 (ref 1–1.03)
SQUAMOUS URNS QL MICRO: (no result) /HPF (ref 5–10)
URINE PRESERVATIVE: (no result)

## 2020-04-01 PROCEDURE — 87077 CULTURE AEROBIC IDENTIFY: CPT

## 2020-04-01 PROCEDURE — 87086 URINE CULTURE/COLONY COUNT: CPT

## 2020-04-01 PROCEDURE — 81001 URINALYSIS AUTO W/SCOPE: CPT

## 2020-04-01 PROCEDURE — 87088 URINE BACTERIA CULTURE: CPT

## 2020-04-04 ENCOUNTER — HOSPITAL ENCOUNTER (EMERGENCY)
Dept: HOSPITAL 100 - ED | Age: 60
Discharge: HOME | End: 2020-04-04
Payer: COMMERCIAL

## 2020-04-04 VITALS
SYSTOLIC BLOOD PRESSURE: 144 MMHG | DIASTOLIC BLOOD PRESSURE: 78 MMHG | HEART RATE: 110 BPM | RESPIRATION RATE: 16 BRPM | OXYGEN SATURATION: 99 %

## 2020-04-04 VITALS
TEMPERATURE: 99.2 F | OXYGEN SATURATION: 97 % | RESPIRATION RATE: 14 BRPM | HEART RATE: 109 BPM | SYSTOLIC BLOOD PRESSURE: 140 MMHG | DIASTOLIC BLOOD PRESSURE: 74 MMHG

## 2020-04-04 VITALS
HEART RATE: 104 BPM | DIASTOLIC BLOOD PRESSURE: 78 MMHG | TEMPERATURE: 99.32 F | OXYGEN SATURATION: 99 % | SYSTOLIC BLOOD PRESSURE: 144 MMHG | RESPIRATION RATE: 16 BRPM

## 2020-04-04 VITALS
SYSTOLIC BLOOD PRESSURE: 125 MMHG | RESPIRATION RATE: 16 BRPM | OXYGEN SATURATION: 99 % | DIASTOLIC BLOOD PRESSURE: 82 MMHG | TEMPERATURE: 97.88 F | HEART RATE: 102 BPM

## 2020-04-04 VITALS
DIASTOLIC BLOOD PRESSURE: 90 MMHG | SYSTOLIC BLOOD PRESSURE: 140 MMHG | RESPIRATION RATE: 18 BRPM | OXYGEN SATURATION: 99 % | HEART RATE: 111 BPM

## 2020-04-04 VITALS — TEMPERATURE: 99.2 F

## 2020-04-04 VITALS — BODY MASS INDEX: 25.9 KG/M2

## 2020-04-04 DIAGNOSIS — K76.0: ICD-10-CM

## 2020-04-04 DIAGNOSIS — R16.1: Primary | ICD-10-CM

## 2020-04-04 DIAGNOSIS — R50.9: ICD-10-CM

## 2020-04-04 DIAGNOSIS — I49.1: ICD-10-CM

## 2020-04-04 DIAGNOSIS — R19.7: ICD-10-CM

## 2020-04-04 DIAGNOSIS — N39.0: ICD-10-CM

## 2020-04-04 DIAGNOSIS — Z79.899: ICD-10-CM

## 2020-04-04 DIAGNOSIS — E78.00: ICD-10-CM

## 2020-04-04 DIAGNOSIS — J98.11: ICD-10-CM

## 2020-04-04 DIAGNOSIS — D72.819: ICD-10-CM

## 2020-04-04 DIAGNOSIS — D69.6: ICD-10-CM

## 2020-04-04 LAB
ALANINE AMINOTRANSFER ALT/SGPT: 58 U/L (ref 13–56)
ALBUMIN SERPL-MCNC: 3.3 G/DL (ref 3.2–5)
ALKALINE PHOSPHATASE: 72 U/L (ref 45–117)
ANION GAP: 6 (ref 5–15)
APTT PPP: 29.2 SECONDS (ref 24.1–36.2)
AST(SGOT): 60 U/L (ref 15–37)
BUN SERPL-MCNC: 12 MG/DL (ref 7–18)
BUN/CREAT RATIO: 14.5 RATIO (ref 10–20)
CALCIUM SERPL-MCNC: 8 MG/DL (ref 8.5–10.1)
CARBON DIOXIDE: 29 MMOL/L (ref 21–32)
CHLORIDE: 101 MMOL/L (ref 98–107)
CPK TOTAL, CREATINE KINASE: 26 U/L (ref 26–192)
DEPRECATED RDW RBC: 53.3 FL (ref 35.1–43.9)
DIFFERENTIAL INDICATED: (no result)
ERYTHROCYTE [DISTWIDTH] IN BLOOD: 16.4 % (ref 11.6–14.6)
EST GLOM FILT RATE - AFR AMER: 90 ML/MIN (ref 60–?)
ESTIMATED CREATININE CLEARANCE: 64.86 ML/MIN
GLOBULIN: 2.7 G/DL (ref 2.2–4.2)
GLUCOSE: 95 MG/DL (ref 74–106)
HCT VFR BLD AUTO: 41 % (ref 37–47)
HEMOGLOBIN: 12.3 G/DL (ref 12–15)
HGB BLD-MCNC: 12.3 G/DL (ref 12–15)
IMMATURE GRANULOCYTES COUNT: 0.06 X10^3/UL (ref 0–0)
LIPASE: 126 U/L (ref 73–393)
MCV RBC: 89.5 FL (ref 81–99)
MEAN CORP HGB CONC: 30 G/DL (ref 32–36)
MEAN PLATELET VOL.: 10.8 FL (ref 6.2–12)
MUCOUS THREADS URNS QL MICRO: (no result) /HPF
NRBC FLAGGED BY ANALYZER: 0 % (ref 0–5)
PLATELET # BLD: 88 K/MM3 (ref 150–450)
PLATELET COUNT: 88 K/MM3 (ref 150–450)
POSITIVE COUNT: YES
POSITIVE MORPHOLOGY: YES
POTASSIUM: 3.2 MMOL/L (ref 3.5–5.1)
PROT UR QL STRIP.AUTO: 30 MG/DL
PROTHROMBIN TIME (PROTIME)PT.: 13.3 SECONDS (ref 11.7–14.9)
RBC # BLD AUTO: 4.58 M/MM3 (ref 4.2–5.4)
RBC DISTRIBUTION WIDTH CV: 16.4 % (ref 11.6–14.6)
RBC DISTRIBUTION WIDTH SD: 53.3 FL (ref 35.1–43.9)
RBC MORPH BLD: (no result) NORMAL
RBC UR QL: (no result) /HPF (ref 0–5)
RBC UR QL: 10 /UL
REACTIVE LYMPHOCYTE: (no result)
RED CELL MORPHOLOGY: (no result) NORMAL
SCAN SMEAR PER REVIEW CRITERIA: (no result)
SP GR UR: 1.01 (ref 1–1.03)
SQUAMOUS URNS QL MICRO: (no result) /HPF (ref 5–10)
URINE PRESERVATIVE: (no result)
WBC # BLD AUTO: 2.8 K/MM3 (ref 4.4–11)
WHITE BLOOD COUNT: 2.8 K/MM3 (ref 4.4–11)

## 2020-04-04 PROCEDURE — 82550 ASSAY OF CK (CPK): CPT

## 2020-04-04 PROCEDURE — 86664 EPSTEIN-BARR NUCLEAR ANTIGEN: CPT

## 2020-04-04 PROCEDURE — 93005 ELECTROCARDIOGRAM TRACING: CPT

## 2020-04-04 PROCEDURE — 85025 COMPLETE CBC W/AUTO DIFF WBC: CPT

## 2020-04-04 PROCEDURE — 83690 ASSAY OF LIPASE: CPT

## 2020-04-04 PROCEDURE — 83605 ASSAY OF LACTIC ACID: CPT

## 2020-04-04 PROCEDURE — 87040 BLOOD CULTURE FOR BACTERIA: CPT

## 2020-04-04 PROCEDURE — 86665 EPSTEIN-BARR CAPSID VCA: CPT

## 2020-04-04 PROCEDURE — 96374 THER/PROPH/DIAG INJ IV PUSH: CPT

## 2020-04-04 PROCEDURE — 85610 PROTHROMBIN TIME: CPT

## 2020-04-04 PROCEDURE — 99284 EMERGENCY DEPT VISIT MOD MDM: CPT

## 2020-04-04 PROCEDURE — 86644 CMV ANTIBODY: CPT

## 2020-04-04 PROCEDURE — 71045 X-RAY EXAM CHEST 1 VIEW: CPT

## 2020-04-04 PROCEDURE — 86645 CMV ANTIBODY IGM: CPT

## 2020-04-04 PROCEDURE — 81001 URINALYSIS AUTO W/SCOPE: CPT

## 2020-04-04 PROCEDURE — 85730 THROMBOPLASTIN TIME PARTIAL: CPT

## 2020-04-04 PROCEDURE — A4216 STERILE WATER/SALINE, 10 ML: HCPCS

## 2020-04-04 PROCEDURE — 87086 URINE CULTURE/COLONY COUNT: CPT

## 2020-04-04 PROCEDURE — 80053 COMPREHEN METABOLIC PANEL: CPT

## 2020-04-04 PROCEDURE — 96361 HYDRATE IV INFUSION ADD-ON: CPT

## 2020-04-04 PROCEDURE — 74177 CT ABD & PELVIS W/CONTRAST: CPT

## 2020-04-04 RX ADMIN — SODIUM CHLORIDE 1000 ML: 9 INJECTION, SOLUTION INTRAVENOUS at 10:37

## 2020-04-07 LAB
CMV ACUTE ANTIBODY IGM: < 30 AU/ML (ref 0–29.9)
CMV IGM SERPL-ACNC: < 30 AU/ML (ref 0–29.9)
EBV ACUTE VCA IGM: < 36 U/ML (ref 0–35.9)
EBV VCA IGM SER-ACNC: < 36 U/ML (ref 0–35.9)
EBV-VCA IGG: 104 U/ML (ref 0–17.9)

## 2020-04-10 ENCOUNTER — HOSPITAL ENCOUNTER (OUTPATIENT)
Dept: HOSPITAL 100 - LABSPEC | Age: 60
Discharge: HOME | End: 2020-04-10
Payer: COMMERCIAL

## 2020-04-10 VITALS — BODY MASS INDEX: 25.9 KG/M2

## 2020-04-10 DIAGNOSIS — R19.7: Primary | ICD-10-CM

## 2020-04-10 PROCEDURE — 87493 C DIFF AMPLIFIED PROBE: CPT

## 2020-05-22 ENCOUNTER — HOSPITAL ENCOUNTER (OUTPATIENT)
Dept: HOSPITAL 100 - LAB | Age: 60
Discharge: HOME | End: 2020-05-22
Payer: COMMERCIAL

## 2020-05-22 VITALS — BODY MASS INDEX: 25.9 KG/M2

## 2020-05-22 DIAGNOSIS — J84.116: Primary | ICD-10-CM

## 2020-05-22 LAB
ALANINE AMINOTRANSFER ALT/SGPT: 30 U/L (ref 13–56)
ALBUMIN SERPL-MCNC: 3.4 G/DL (ref 3.2–5)
ALKALINE PHOSPHATASE: 81 U/L (ref 45–117)
AST(SGOT): 29 U/L (ref 15–37)
BILIRUB DIRECT SERPL-MCNC: 0.12 MG/DL (ref 0–0.3)
BUN SERPL-MCNC: 16 MG/DL (ref 7–18)
DEPRECATED RDW RBC: 52.6 FL (ref 35.1–43.9)
ERYTHROCYTE [DISTWIDTH] IN BLOOD: 16.4 % (ref 11.6–14.6)
GLOBULIN: 3 G/DL (ref 2.2–4.2)
HCT VFR BLD AUTO: 42.2 % (ref 37–47)
HEMOGLOBIN: 12.8 G/DL (ref 12–15)
HGB BLD-MCNC: 12.8 G/DL (ref 12–15)
MCV RBC: 87.9 FL (ref 81–99)
MEAN CORP HGB CONC: 30.3 G/DL (ref 32–36)
MEAN PLATELET VOL.: 9.5 FL (ref 6.2–12)
PLATELET # BLD: 192 K/MM3 (ref 150–450)
PLATELET COUNT: 192 K/MM3 (ref 150–450)
RBC # BLD AUTO: 4.8 M/MM3 (ref 4.2–5.4)
RBC DISTRIBUTION WIDTH CV: 16.4 % (ref 11.6–14.6)
RBC DISTRIBUTION WIDTH SD: 52.6 FL (ref 35.1–43.9)
WBC # BLD AUTO: 5.4 K/MM3 (ref 4.4–11)
WHITE BLOOD COUNT: 5.4 K/MM3 (ref 4.4–11)

## 2020-05-22 PROCEDURE — 80076 HEPATIC FUNCTION PANEL: CPT

## 2020-05-22 PROCEDURE — 84520 ASSAY OF UREA NITROGEN: CPT

## 2020-05-22 PROCEDURE — 36415 COLL VENOUS BLD VENIPUNCTURE: CPT

## 2020-05-22 PROCEDURE — 85027 COMPLETE CBC AUTOMATED: CPT

## 2020-06-12 ENCOUNTER — HOSPITAL ENCOUNTER (OUTPATIENT)
Dept: HOSPITAL 100 - LAB | Age: 60
Discharge: HOME | End: 2020-06-12
Payer: COMMERCIAL

## 2020-06-12 VITALS — BODY MASS INDEX: 25.9 KG/M2

## 2020-06-12 DIAGNOSIS — M33.12: Primary | ICD-10-CM

## 2020-06-12 DIAGNOSIS — J45.909: ICD-10-CM

## 2020-06-12 DIAGNOSIS — J84.116: ICD-10-CM

## 2020-06-12 LAB
ALANINE AMINOTRANSFER ALT/SGPT: 26 U/L (ref 13–56)
ALBUMIN SERPL-MCNC: 3.5 G/DL (ref 3.2–5)
ALKALINE PHOSPHATASE: 81 U/L (ref 45–117)
AST(SGOT): 25 U/L (ref 15–37)
BILIRUB DIRECT SERPL-MCNC: 0.08 MG/DL (ref 0–0.3)
BUN SERPL-MCNC: 12 MG/DL (ref 7–18)
CPK TOTAL, CREATINE KINASE: 57 U/L (ref 26–192)
DEPRECATED RDW RBC: 52.8 FL (ref 35.1–43.9)
ERYTHROCYTE [DISTWIDTH] IN BLOOD: 16.1 % (ref 11.6–14.6)
GLOBULIN: 3.1 G/DL (ref 2.2–4.2)
HCT VFR BLD AUTO: 42.1 % (ref 37–47)
HEMOGLOBIN: 12.6 G/DL (ref 12–15)
HGB BLD-MCNC: 12.6 G/DL (ref 12–15)
LDH: 229 U/L (ref 84–246)
MCV RBC: 89.8 FL (ref 81–99)
MEAN CORP HGB CONC: 29.9 G/DL (ref 32–36)
MEAN PLATELET VOL.: 9.8 FL (ref 6.2–12)
PLATELET # BLD: 189 K/MM3 (ref 150–450)
PLATELET COUNT: 189 K/MM3 (ref 150–450)
RBC # BLD AUTO: 4.69 M/MM3 (ref 4.2–5.4)
RBC DISTRIBUTION WIDTH CV: 16.1 % (ref 11.6–14.6)
RBC DISTRIBUTION WIDTH SD: 52.8 FL (ref 35.1–43.9)
WBC # BLD AUTO: 5.6 K/MM3 (ref 4.4–11)
WHITE BLOOD COUNT: 5.6 K/MM3 (ref 4.4–11)

## 2020-06-12 PROCEDURE — 80076 HEPATIC FUNCTION PANEL: CPT

## 2020-06-12 PROCEDURE — 82085 ASSAY OF ALDOLASE: CPT

## 2020-06-12 PROCEDURE — 83615 LACTATE (LD) (LDH) ENZYME: CPT

## 2020-06-12 PROCEDURE — 85027 COMPLETE CBC AUTOMATED: CPT

## 2020-06-12 PROCEDURE — 84520 ASSAY OF UREA NITROGEN: CPT

## 2020-06-12 PROCEDURE — 86334 IMMUNOFIX E-PHORESIS SERUM: CPT

## 2020-06-12 PROCEDURE — 36415 COLL VENOUS BLD VENIPUNCTURE: CPT

## 2020-06-12 PROCEDURE — 84165 PROTEIN E-PHORESIS SERUM: CPT

## 2020-06-12 PROCEDURE — 82784 ASSAY IGA/IGD/IGG/IGM EACH: CPT

## 2020-06-12 PROCEDURE — 82550 ASSAY OF CK (CPK): CPT

## 2020-06-16 LAB
ALBUMIN SERPL ELPH-MCNC: 3.3 G/DL (ref 2.9–4.4)
GLOBULIN SER-MCNC: 2.6 G/DL (ref 2.2–3.9)
IGA SERPL-MCNC: 120 MG/DL (ref 87–352)
IGG SERPL-MCNC: 565 MG/DL (ref 586–1602)
IGM SERPL-MCNC: 36 MG/DL (ref 26–217)
IMMUNOGLOBULIN A: 120 MG/DL (ref 87–352)
IMMUNOGLOBULIN G: 565 MG/DL (ref 586–1602)
IMMUNOGLOBULIN M: 36 MG/DL (ref 26–217)
PROEL- A/G RATIO: 1.3 (ref 0.7–1.7)
PROEL- ALPHA-1 GLOBULIN: 0.2 G/DL (ref 0–0.4)
PROEL- ALPHA-2 GLOBULIN: 0.7 G/DL (ref 0.4–1)
PROEL- BETA GLOBULIN: 1.1 G/DL (ref 0.7–1.3)
PROEL- GAMMA GLOBULIN: 0.6 G/DL (ref 0.4–1.8)
PROEL- TOTAL PROTEIN: 5.9 G/DL (ref 6–8.5)
PROT SERPL-MCNC: 5.9 G/DL (ref 6–8.5)

## 2020-07-15 ENCOUNTER — HOSPITAL ENCOUNTER (OUTPATIENT)
Dept: HOSPITAL 100 - LAB | Age: 60
Discharge: HOME | End: 2020-07-15
Payer: COMMERCIAL

## 2020-07-15 VITALS — BODY MASS INDEX: 25.9 KG/M2

## 2020-07-15 DIAGNOSIS — E21.3: ICD-10-CM

## 2020-07-15 DIAGNOSIS — J84.116: Primary | ICD-10-CM

## 2020-07-15 DIAGNOSIS — R73.9: ICD-10-CM

## 2020-07-15 DIAGNOSIS — J45.909: ICD-10-CM

## 2020-07-15 LAB
ALANINE AMINOTRANSFER ALT/SGPT: 29 U/L (ref 13–56)
ALBUMIN SERPL-MCNC: 3.4 G/DL (ref 3.2–5)
ALKALINE PHOSPHATASE: 81 U/L (ref 45–117)
AST(SGOT): 25 U/L (ref 15–37)
BILIRUB DIRECT SERPL-MCNC: 0.11 MG/DL (ref 0–0.3)
BUN SERPL-MCNC: 18 MG/DL (ref 7–18)
CALCIUM 24H UR-MCNC: 16.5 MG/DL
CREAT UR-MCNC: 230 MG/DL
DEPRECATED RDW RBC: 50.4 FL (ref 35.1–43.9)
ERYTHROCYTE [DISTWIDTH] IN BLOOD: 16 % (ref 11.6–14.6)
GLOBULIN: 2.9 G/DL (ref 2.2–4.2)
HCT VFR BLD AUTO: 44.1 % (ref 37–47)
HEMOGLOBIN: 13.5 G/DL (ref 12–15)
HGB BLD-MCNC: 13.5 G/DL (ref 12–15)
MAGNESIUM: 2.3 MG/DL (ref 1.6–2.6)
MCV RBC: 86.8 FL (ref 81–99)
MEAN CORP HGB CONC: 30.6 G/DL (ref 32–36)
MEAN PLATELET VOL.: 9.5 FL (ref 6.2–12)
PLATELET # BLD: 174 K/MM3 (ref 150–450)
PLATELET COUNT: 174 K/MM3 (ref 150–450)
PTHIN: 50.9 PG/ML (ref 18.4–80.1)
RBC # BLD AUTO: 5.08 M/MM3 (ref 4.2–5.4)
RBC DISTRIBUTION WIDTH CV: 16 % (ref 11.6–14.6)
RBC DISTRIBUTION WIDTH SD: 50.4 FL (ref 35.1–43.9)
VITAMIN D,25 HYDROXY: 83.7 NG/ML
WBC # BLD AUTO: 4.5 K/MM3 (ref 4.4–11)
WHITE BLOOD COUNT: 4.5 K/MM3 (ref 4.4–11)

## 2020-07-15 PROCEDURE — 82570 ASSAY OF URINE CREATININE: CPT

## 2020-07-15 PROCEDURE — 83036 HEMOGLOBIN GLYCOSYLATED A1C: CPT

## 2020-07-15 PROCEDURE — 82340 ASSAY OF CALCIUM IN URINE: CPT

## 2020-07-15 PROCEDURE — 85027 COMPLETE CBC AUTOMATED: CPT

## 2020-07-15 PROCEDURE — 36415 COLL VENOUS BLD VENIPUNCTURE: CPT

## 2020-07-15 PROCEDURE — 84520 ASSAY OF UREA NITROGEN: CPT

## 2020-07-15 PROCEDURE — 83970 ASSAY OF PARATHORMONE: CPT

## 2020-07-15 PROCEDURE — 82330 ASSAY OF CALCIUM: CPT

## 2020-07-15 PROCEDURE — 80076 HEPATIC FUNCTION PANEL: CPT

## 2020-07-15 PROCEDURE — 83735 ASSAY OF MAGNESIUM: CPT

## 2020-07-15 PROCEDURE — 82306 VITAMIN D 25 HYDROXY: CPT

## 2020-07-15 PROCEDURE — 84100 ASSAY OF PHOSPHORUS: CPT

## 2020-07-15 PROCEDURE — 82652 VIT D 1 25-DIHYDROXY: CPT

## 2020-07-17 LAB — 1,25(OH)2D3 SERPL-MCNC: 60.7 PG/ML (ref 19.9–79.3)

## 2020-08-19 ENCOUNTER — HOSPITAL ENCOUNTER (OUTPATIENT)
Dept: HOSPITAL 100 - LAB | Age: 60
LOS: 12 days | Discharge: HOME | End: 2020-08-31
Payer: COMMERCIAL

## 2020-08-19 VITALS — BODY MASS INDEX: 25.9 KG/M2

## 2020-08-19 DIAGNOSIS — J45.909: ICD-10-CM

## 2020-08-19 DIAGNOSIS — J84.116: Primary | ICD-10-CM

## 2020-08-19 LAB
ALANINE AMINOTRANSFER ALT/SGPT: 26 U/L (ref 13–56)
ALBUMIN SERPL-MCNC: 3.5 G/DL (ref 3.2–5)
ALKALINE PHOSPHATASE: 96 U/L (ref 45–117)
AST(SGOT): 27 U/L (ref 15–37)
BILIRUB DIRECT SERPL-MCNC: 0.08 MG/DL (ref 0–0.3)
BUN SERPL-MCNC: 14 MG/DL (ref 7–18)
DEPRECATED RDW RBC: 52.3 FL (ref 35.1–43.9)
ERYTHROCYTE [DISTWIDTH] IN BLOOD: 16.5 % (ref 11.6–14.6)
GLOBULIN: 2.7 G/DL (ref 2.2–4.2)
HCT VFR BLD AUTO: 42.7 % (ref 37–47)
HEMOGLOBIN: 12.9 G/DL (ref 12–15)
HGB BLD-MCNC: 12.9 G/DL (ref 12–15)
MCV RBC: 88 FL (ref 81–99)
MEAN CORP HGB CONC: 30.2 G/DL (ref 32–36)
MEAN PLATELET VOL.: 9.4 FL (ref 6.2–12)
PLATELET # BLD: 179 K/MM3 (ref 150–450)
PLATELET COUNT: 179 K/MM3 (ref 150–450)
RBC # BLD AUTO: 4.85 M/MM3 (ref 4.2–5.4)
RBC DISTRIBUTION WIDTH CV: 16.5 % (ref 11.6–14.6)
RBC DISTRIBUTION WIDTH SD: 52.3 FL (ref 35.1–43.9)
WBC # BLD AUTO: 7.3 K/MM3 (ref 4.4–11)
WHITE BLOOD COUNT: 7.3 K/MM3 (ref 4.4–11)

## 2020-08-19 PROCEDURE — 36415 COLL VENOUS BLD VENIPUNCTURE: CPT

## 2020-08-19 PROCEDURE — 80076 HEPATIC FUNCTION PANEL: CPT

## 2020-08-19 PROCEDURE — 84520 ASSAY OF UREA NITROGEN: CPT

## 2020-08-19 PROCEDURE — 85027 COMPLETE CBC AUTOMATED: CPT

## 2020-09-17 ENCOUNTER — HOSPITAL ENCOUNTER (OUTPATIENT)
Dept: HOSPITAL 100 - LAB | Age: 60
Discharge: HOME | End: 2020-09-17
Payer: COMMERCIAL

## 2020-09-17 VITALS — BODY MASS INDEX: 25.9 KG/M2

## 2020-09-17 DIAGNOSIS — J84.116: Primary | ICD-10-CM

## 2020-09-17 DIAGNOSIS — J45.909: ICD-10-CM

## 2020-09-17 LAB
ALANINE AMINOTRANSFER ALT/SGPT: 23 U/L (ref 13–56)
ALBUMIN SERPL-MCNC: 3.3 G/DL (ref 3.2–5)
ALKALINE PHOSPHATASE: 92 U/L (ref 45–117)
AST(SGOT): 22 U/L (ref 15–37)
BILIRUB DIRECT SERPL-MCNC: 0.12 MG/DL (ref 0–0.3)
BUN SERPL-MCNC: 16 MG/DL (ref 7–18)
DEPRECATED RDW RBC: 53 FL (ref 35.1–43.9)
ERYTHROCYTE [DISTWIDTH] IN BLOOD: 16.2 % (ref 11.6–14.6)
GLOBULIN: 2.9 G/DL (ref 2.2–4.2)
HCT VFR BLD AUTO: 39.9 % (ref 37–47)
HEMOGLOBIN: 12 G/DL (ref 12–15)
HGB BLD-MCNC: 12 G/DL (ref 12–15)
MCV RBC: 89.1 FL (ref 81–99)
MEAN CORP HGB CONC: 30.1 G/DL (ref 32–36)
MEAN PLATELET VOL.: 10 FL (ref 6.2–12)
PLATELET # BLD: 172 K/MM3 (ref 150–450)
PLATELET COUNT: 172 K/MM3 (ref 150–450)
RBC # BLD AUTO: 4.48 M/MM3 (ref 4.2–5.4)
RBC DISTRIBUTION WIDTH CV: 16.2 % (ref 11.6–14.6)
RBC DISTRIBUTION WIDTH SD: 53 FL (ref 35.1–43.9)
WBC # BLD AUTO: 5 K/MM3 (ref 4.4–11)
WHITE BLOOD COUNT: 5 K/MM3 (ref 4.4–11)

## 2020-09-17 PROCEDURE — 85027 COMPLETE CBC AUTOMATED: CPT

## 2020-09-17 PROCEDURE — 36415 COLL VENOUS BLD VENIPUNCTURE: CPT

## 2020-09-17 PROCEDURE — 84520 ASSAY OF UREA NITROGEN: CPT

## 2020-09-17 PROCEDURE — 80076 HEPATIC FUNCTION PANEL: CPT

## 2020-09-24 ENCOUNTER — HOSPITAL ENCOUNTER (OUTPATIENT)
Age: 60
End: 2020-09-24
Payer: COMMERCIAL

## 2020-09-24 VITALS — BODY MASS INDEX: 25.9 KG/M2

## 2020-09-24 DIAGNOSIS — J84.116: Primary | ICD-10-CM

## 2020-09-24 DIAGNOSIS — J45.909: ICD-10-CM

## 2020-09-24 LAB
DEPRECATED RDW RBC: 51.6 FL (ref 35.1–43.9)
ERYTHROCYTE [DISTWIDTH] IN BLOOD: 15.9 % (ref 11.6–14.6)
HCT VFR BLD AUTO: 40.4 % (ref 37–47)
HEMOGLOBIN: 12.1 G/DL (ref 12–15)
HGB BLD-MCNC: 12.1 G/DL (ref 12–15)
IMMATURE GRANULOCYTES COUNT: 0.04 X10^3/UL (ref 0–0)
MCV RBC: 88.4 FL (ref 81–99)
MEAN CORP HGB CONC: 30 G/DL (ref 32–36)
MEAN PLATELET VOL.: 10.1 FL (ref 6.2–12)
NRBC FLAGGED BY ANALYZER: 0 % (ref 0–5)
PLATELET # BLD: 159 K/MM3 (ref 150–450)
PLATELET COUNT: 159 K/MM3 (ref 150–450)
RBC # BLD AUTO: 4.57 M/MM3 (ref 4.2–5.4)
RBC DISTRIBUTION WIDTH CV: 15.9 % (ref 11.6–14.6)
RBC DISTRIBUTION WIDTH SD: 51.6 FL (ref 35.1–43.9)
WBC # BLD AUTO: 6.6 K/MM3 (ref 4.4–11)
WHITE BLOOD COUNT: 6.6 K/MM3 (ref 4.4–11)

## 2020-09-24 PROCEDURE — 71046 X-RAY EXAM CHEST 2 VIEWS: CPT

## 2020-09-24 PROCEDURE — 85025 COMPLETE CBC W/AUTO DIFF WBC: CPT

## 2020-09-24 PROCEDURE — 36415 COLL VENOUS BLD VENIPUNCTURE: CPT

## 2020-09-25 ENCOUNTER — HOSPITAL ENCOUNTER (OUTPATIENT)
Age: 60
End: 2020-09-25
Payer: COMMERCIAL

## 2020-09-25 VITALS — BODY MASS INDEX: 25.9 KG/M2

## 2020-09-25 DIAGNOSIS — R06.00: Primary | ICD-10-CM

## 2020-09-25 DIAGNOSIS — R05: ICD-10-CM

## 2020-09-25 DIAGNOSIS — R07.9: ICD-10-CM

## 2020-09-25 LAB — D-DIMER QUANTITATIVE (DVT/PE): 0.33 FEU/UG/M (ref 0.27–0.49)

## 2020-09-25 PROCEDURE — 87804 INFLUENZA ASSAY W/OPTIC: CPT

## 2020-09-25 PROCEDURE — 87635 SARS-COV-2 COVID-19 AMP PRB: CPT

## 2020-09-25 PROCEDURE — C9803 HOPD COVID-19 SPEC COLLECT: HCPCS

## 2020-09-25 PROCEDURE — U0003 INFECTIOUS AGENT DETECTION BY NUCLEIC ACID (DNA OR RNA); SEVERE ACUTE RESPIRATORY SYNDROME CORONAVIRUS 2 (SARS-COV-2) (CORONAVIRUS DISEASE [COVID-19]), AMPLIFIED PROBE TECHNIQUE, MAKING USE OF HIGH THROUGHPUT TECHNOLOGIES AS DESCRIBED BY CMS-2020-01-R: HCPCS

## 2020-09-25 PROCEDURE — 85379 FIBRIN DEGRADATION QUANT: CPT

## 2020-09-25 PROCEDURE — 36415 COLL VENOUS BLD VENIPUNCTURE: CPT

## 2020-10-16 ENCOUNTER — HOSPITAL ENCOUNTER (OUTPATIENT)
Dept: HOSPITAL 100 - LAB | Age: 60
Discharge: HOME | End: 2020-10-16
Payer: COMMERCIAL

## 2020-10-16 VITALS — BODY MASS INDEX: 25.9 KG/M2

## 2020-10-16 DIAGNOSIS — J84.116: Primary | ICD-10-CM

## 2020-10-16 DIAGNOSIS — J45.909: ICD-10-CM

## 2020-10-16 LAB
ALANINE AMINOTRANSFER ALT/SGPT: 26 U/L (ref 13–56)
ALBUMIN SERPL-MCNC: 3.4 G/DL (ref 3.2–5)
ALKALINE PHOSPHATASE: 109 U/L (ref 45–117)
AST(SGOT): 28 U/L (ref 15–37)
BILIRUB DIRECT SERPL-MCNC: 0.08 MG/DL (ref 0–0.3)
BUN SERPL-MCNC: 17 MG/DL (ref 7–18)
DEPRECATED RDW RBC: 51.3 FL (ref 35.1–43.9)
ERYTHROCYTE [DISTWIDTH] IN BLOOD: 15.6 % (ref 11.6–14.6)
GLOBULIN: 2.8 G/DL (ref 2.2–4.2)
HCT VFR BLD AUTO: 42.4 % (ref 37–47)
HEMOGLOBIN: 12.3 G/DL (ref 12–15)
HGB BLD-MCNC: 12.3 G/DL (ref 12–15)
MCV RBC: 90.8 FL (ref 81–99)
MEAN CORP HGB CONC: 29 G/DL (ref 32–36)
MEAN PLATELET VOL.: 10 FL (ref 6.2–12)
PLATELET # BLD: 172 K/MM3 (ref 150–450)
PLATELET COUNT: 172 K/MM3 (ref 150–450)
RBC # BLD AUTO: 4.67 M/MM3 (ref 4.2–5.4)
RBC DISTRIBUTION WIDTH CV: 15.6 % (ref 11.6–14.6)
RBC DISTRIBUTION WIDTH SD: 51.3 FL (ref 35.1–43.9)
WBC # BLD AUTO: 4.5 K/MM3 (ref 4.4–11)
WHITE BLOOD COUNT: 4.5 K/MM3 (ref 4.4–11)

## 2020-10-16 PROCEDURE — 80076 HEPATIC FUNCTION PANEL: CPT

## 2020-10-16 PROCEDURE — 36415 COLL VENOUS BLD VENIPUNCTURE: CPT

## 2020-10-16 PROCEDURE — 84520 ASSAY OF UREA NITROGEN: CPT

## 2020-10-16 PROCEDURE — 85027 COMPLETE CBC AUTOMATED: CPT

## 2020-11-16 ENCOUNTER — HOSPITAL ENCOUNTER (OUTPATIENT)
Age: 60
End: 2020-11-16
Payer: COMMERCIAL

## 2020-11-16 VITALS — BODY MASS INDEX: 25.9 KG/M2

## 2020-11-16 DIAGNOSIS — Z12.31: Primary | ICD-10-CM

## 2020-11-16 PROCEDURE — 77067 SCR MAMMO BI INCL CAD: CPT

## 2020-11-16 PROCEDURE — 77063 BREAST TOMOSYNTHESIS BI: CPT

## 2020-11-17 ENCOUNTER — HOSPITAL ENCOUNTER (OUTPATIENT)
Dept: HOSPITAL 100 - LAB | Age: 60
Discharge: HOME | End: 2020-11-17
Payer: COMMERCIAL

## 2020-11-17 VITALS — BODY MASS INDEX: 25.9 KG/M2

## 2020-11-17 DIAGNOSIS — J84.116: Primary | ICD-10-CM

## 2020-11-17 DIAGNOSIS — J45.909: ICD-10-CM

## 2020-11-17 LAB
ALANINE AMINOTRANSFER ALT/SGPT: 25 U/L (ref 13–56)
ALBUMIN SERPL-MCNC: 3.5 G/DL (ref 3.2–5)
ALKALINE PHOSPHATASE: 81 U/L (ref 45–117)
AST(SGOT): 22 U/L (ref 15–37)
BILIRUB DIRECT SERPL-MCNC: 0.1 MG/DL (ref 0–0.3)
BUN SERPL-MCNC: 16 MG/DL (ref 7–18)
DEPRECATED RDW RBC: 48 FL (ref 35.1–43.9)
ERYTHROCYTE [DISTWIDTH] IN BLOOD: 15 % (ref 11.6–14.6)
GLOBULIN: 3.1 G/DL (ref 2.2–4.2)
HCT VFR BLD AUTO: 41.5 % (ref 37–47)
HEMOGLOBIN: 12.5 G/DL (ref 12–15)
HGB BLD-MCNC: 12.5 G/DL (ref 12–15)
MCV RBC: 87.2 FL (ref 81–99)
MEAN CORP HGB CONC: 30.1 G/DL (ref 32–36)
MEAN PLATELET VOL.: 10.7 FL (ref 6.2–12)
PLATELET # BLD: 168 K/MM3 (ref 150–450)
PLATELET COUNT: 168 K/MM3 (ref 150–450)
RBC # BLD AUTO: 4.76 M/MM3 (ref 4.2–5.4)
RBC DISTRIBUTION WIDTH CV: 15 % (ref 11.6–14.6)
RBC DISTRIBUTION WIDTH SD: 48 FL (ref 35.1–43.9)
WBC # BLD AUTO: 4.9 K/MM3 (ref 4.4–11)
WHITE BLOOD COUNT: 4.9 K/MM3 (ref 4.4–11)

## 2020-11-17 PROCEDURE — 85027 COMPLETE CBC AUTOMATED: CPT

## 2020-11-17 PROCEDURE — 80076 HEPATIC FUNCTION PANEL: CPT

## 2020-11-17 PROCEDURE — 36415 COLL VENOUS BLD VENIPUNCTURE: CPT

## 2020-11-17 PROCEDURE — 84520 ASSAY OF UREA NITROGEN: CPT

## 2020-11-30 ENCOUNTER — HOSPITAL ENCOUNTER (OUTPATIENT)
Age: 60
End: 2020-11-30
Payer: COMMERCIAL

## 2020-11-30 VITALS — BODY MASS INDEX: 25.9 KG/M2

## 2020-11-30 DIAGNOSIS — Z12.4: Primary | ICD-10-CM

## 2020-11-30 PROCEDURE — 88175 CYTOPATH C/V AUTO FLUID REDO: CPT

## 2020-11-30 PROCEDURE — G0145 SCR C/V CYTO,THINLAYER,RESCR: HCPCS

## 2020-12-17 ENCOUNTER — HOSPITAL ENCOUNTER (OUTPATIENT)
Dept: HOSPITAL 100 - LAB | Age: 60
Discharge: HOME | End: 2020-12-17
Payer: COMMERCIAL

## 2020-12-17 VITALS — BODY MASS INDEX: 25.9 KG/M2

## 2020-12-17 DIAGNOSIS — J84.116: Primary | ICD-10-CM

## 2020-12-17 DIAGNOSIS — R00.0: ICD-10-CM

## 2020-12-17 DIAGNOSIS — J45.909: ICD-10-CM

## 2020-12-17 DIAGNOSIS — I10: ICD-10-CM

## 2020-12-17 LAB
ALANINE AMINOTRANSFER ALT/SGPT: 26 U/L (ref 13–56)
ALBUMIN SERPL-MCNC: 3.5 G/DL (ref 3.2–5)
ALKALINE PHOSPHATASE: 82 U/L (ref 45–117)
ANION GAP: 7 (ref 5–15)
AST(SGOT): 25 U/L (ref 15–37)
BILIRUB DIRECT SERPL-MCNC: 0.1 MG/DL (ref 0–0.3)
BUN SERPL-MCNC: 19 MG/DL (ref 7–18)
BUN/CREAT RATIO: 20.8 RATIO (ref 10–20)
CALCIUM SERPL-MCNC: 8.2 MG/DL (ref 8.5–10.1)
CARBON DIOXIDE: 26 MMOL/L (ref 21–32)
CHLORIDE: 105 MMOL/L (ref 98–107)
DEPRECATED RDW RBC: 49.2 FL (ref 35.1–43.9)
ERYTHROCYTE [DISTWIDTH] IN BLOOD: 15.5 % (ref 11.6–14.6)
EST GLOM FILT RATE - AFR AMER: 80 ML/MIN (ref 60–?)
GLOBULIN: 2.7 G/DL (ref 2.2–4.2)
GLUCOSE: 69 MG/DL (ref 74–106)
HCT VFR BLD AUTO: 39.1 % (ref 37–47)
HEMOGLOBIN: 11.9 G/DL (ref 12–15)
HGB BLD-MCNC: 11.9 G/DL (ref 12–15)
MCV RBC: 87.3 FL (ref 81–99)
MEAN CORP HGB CONC: 30.4 G/DL (ref 32–36)
MEAN PLATELET VOL.: 10.2 FL (ref 6.2–12)
PLATELET # BLD: 164 K/MM3 (ref 150–450)
PLATELET COUNT: 164 K/MM3 (ref 150–450)
POTASSIUM: 3.8 MMOL/L (ref 3.5–5.1)
RBC # BLD AUTO: 4.48 M/MM3 (ref 4.2–5.4)
RBC DISTRIBUTION WIDTH CV: 15.5 % (ref 11.6–14.6)
RBC DISTRIBUTION WIDTH SD: 49.2 FL (ref 35.1–43.9)
WBC # BLD AUTO: 6.3 K/MM3 (ref 4.4–11)
WHITE BLOOD COUNT: 6.3 K/MM3 (ref 4.4–11)

## 2020-12-17 PROCEDURE — 84439 ASSAY OF FREE THYROXINE: CPT

## 2020-12-17 PROCEDURE — 80053 COMPREHEN METABOLIC PANEL: CPT

## 2020-12-17 PROCEDURE — 84443 ASSAY THYROID STIM HORMONE: CPT

## 2020-12-17 PROCEDURE — 82248 BILIRUBIN DIRECT: CPT

## 2020-12-17 PROCEDURE — 85027 COMPLETE CBC AUTOMATED: CPT

## 2020-12-17 PROCEDURE — 36415 COLL VENOUS BLD VENIPUNCTURE: CPT

## 2021-01-15 LAB — CRP SERPL-MCNC: < 2.9 MG/L (ref 0–3)

## 2021-01-18 ENCOUNTER — HOSPITAL ENCOUNTER (OUTPATIENT)
Dept: HOSPITAL 100 - LAB | Age: 61
Discharge: HOME | End: 2021-01-18
Payer: COMMERCIAL

## 2021-01-18 VITALS — BODY MASS INDEX: 25.9 KG/M2

## 2021-01-18 DIAGNOSIS — T14.8XXA: ICD-10-CM

## 2021-01-18 DIAGNOSIS — M33.12: ICD-10-CM

## 2021-01-18 DIAGNOSIS — L29.8: ICD-10-CM

## 2021-01-18 DIAGNOSIS — J84.116: Primary | ICD-10-CM

## 2021-01-18 DIAGNOSIS — J45.909: ICD-10-CM

## 2021-01-18 LAB
ALANINE AMINOTRANSFER ALT/SGPT: 27 U/L (ref 13–56)
ALBUMIN SERPL-MCNC: 3.7 G/DL (ref 3.2–5)
ALKALINE PHOSPHATASE: 86 U/L (ref 45–117)
AST(SGOT): 27 U/L (ref 15–37)
BILIRUB DIRECT SERPL-MCNC: 0.08 MG/DL (ref 0–0.3)
BUN SERPL-MCNC: 16 MG/DL (ref 7–18)
DEPRECATED RDW RBC: 51.2 FL (ref 35.1–43.9)
ERYTHROCYTE [DISTWIDTH] IN BLOOD: 15.7 % (ref 11.6–14.6)
GLOBULIN: 2.9 G/DL (ref 2.2–4.2)
HCT VFR BLD AUTO: 43.2 % (ref 37–47)
HEMOGLOBIN: 12.8 G/DL (ref 12–15)
HGB BLD-MCNC: 12.8 G/DL (ref 12–15)
MCV RBC: 89.1 FL (ref 81–99)
MEAN CORP HGB CONC: 29.6 G/DL (ref 32–36)
MEAN PLATELET VOL.: 10.2 FL (ref 6.2–12)
PLATELET # BLD: 184 K/MM3 (ref 150–450)
PLATELET COUNT: 184 K/MM3 (ref 150–450)
RBC # BLD AUTO: 4.85 M/MM3 (ref 4.2–5.4)
RBC DISTRIBUTION WIDTH CV: 15.7 % (ref 11.6–14.6)
RBC DISTRIBUTION WIDTH SD: 51.2 FL (ref 35.1–43.9)
WBC # BLD AUTO: 5.6 K/MM3 (ref 4.4–11)
WHITE BLOOD COUNT: 5.6 K/MM3 (ref 4.4–11)

## 2021-01-18 PROCEDURE — 84520 ASSAY OF UREA NITROGEN: CPT

## 2021-01-18 PROCEDURE — 85027 COMPLETE CBC AUTOMATED: CPT

## 2021-01-18 PROCEDURE — 36415 COLL VENOUS BLD VENIPUNCTURE: CPT

## 2021-01-18 PROCEDURE — 86140 C-REACTIVE PROTEIN: CPT

## 2021-01-18 PROCEDURE — 80076 HEPATIC FUNCTION PANEL: CPT

## 2021-01-18 PROCEDURE — 85652 RBC SED RATE AUTOMATED: CPT

## 2021-02-06 ENCOUNTER — HOSPITAL ENCOUNTER (OUTPATIENT)
Age: 61
End: 2021-02-06
Payer: COMMERCIAL

## 2021-02-06 VITALS — BODY MASS INDEX: 25.9 KG/M2

## 2021-02-06 DIAGNOSIS — E55.9: ICD-10-CM

## 2021-02-06 DIAGNOSIS — M81.0: Primary | ICD-10-CM

## 2021-02-06 DIAGNOSIS — J84.116: ICD-10-CM

## 2021-02-06 DIAGNOSIS — E21.3: ICD-10-CM

## 2021-02-06 LAB
ALANINE AMINOTRANSFER ALT/SGPT: 25 U/L (ref 13–56)
ALBUMIN SERPL-MCNC: 3.8 G/DL (ref 3.2–5)
ALKALINE PHOSPHATASE: 73 U/L (ref 45–117)
ANION GAP: 6 (ref 5–15)
AST(SGOT): 21 U/L (ref 15–37)
BUN SERPL-MCNC: 20 MG/DL (ref 7–18)
BUN/CREAT RATIO: 21.6 RATIO (ref 10–20)
CALCIUM SERPL-MCNC: 9.1 MG/DL (ref 8.5–10.1)
CARBON DIOXIDE: 25 MMOL/L (ref 21–32)
CHLORIDE: 109 MMOL/L (ref 98–107)
EST GLOM FILT RATE - AFR AMER: 79 ML/MIN (ref 60–?)
GLOBULIN: 2.9 G/DL (ref 2.2–4.2)
GLUCOSE: 133 MG/DL (ref 74–106)
POTASSIUM: 3.4 MMOL/L (ref 3.5–5.1)

## 2021-02-06 PROCEDURE — 36415 COLL VENOUS BLD VENIPUNCTURE: CPT

## 2021-02-06 PROCEDURE — 83036 HEMOGLOBIN GLYCOSYLATED A1C: CPT

## 2021-02-06 PROCEDURE — 84443 ASSAY THYROID STIM HORMONE: CPT

## 2021-02-06 PROCEDURE — 83970 ASSAY OF PARATHORMONE: CPT

## 2021-02-06 PROCEDURE — 80053 COMPREHEN METABOLIC PANEL: CPT

## 2021-02-06 PROCEDURE — 82306 VITAMIN D 25 HYDROXY: CPT

## 2021-02-06 PROCEDURE — 82330 ASSAY OF CALCIUM: CPT

## 2021-02-08 LAB
PTHIN: 40.7 PG/ML (ref 18.4–80.1)
VITAMIN D,25 HYDROXY: 54.8 NG/ML

## 2021-02-10 ENCOUNTER — HOSPITAL ENCOUNTER (OUTPATIENT)
Dept: HOSPITAL 100 - LAB.FUTURE | Age: 61
End: 2021-02-10
Payer: COMMERCIAL

## 2021-02-10 VITALS — BODY MASS INDEX: 25.9 KG/M2

## 2021-02-10 DIAGNOSIS — J84.116: Primary | ICD-10-CM

## 2021-02-10 DIAGNOSIS — E55.9: ICD-10-CM

## 2021-02-10 DIAGNOSIS — E21.3: ICD-10-CM

## 2021-02-10 PROCEDURE — 82330 ASSAY OF CALCIUM: CPT

## 2021-04-15 ENCOUNTER — HOSPITAL ENCOUNTER (OUTPATIENT)
Dept: HOSPITAL 100 - LAB | Age: 61
Discharge: HOME | End: 2021-04-15
Payer: COMMERCIAL

## 2021-04-15 VITALS — BODY MASS INDEX: 25.9 KG/M2

## 2021-04-15 DIAGNOSIS — J45.20: ICD-10-CM

## 2021-04-15 DIAGNOSIS — J84.116: Primary | ICD-10-CM

## 2021-04-15 LAB
ALANINE AMINOTRANSFER ALT/SGPT: 25 U/L (ref 13–56)
ALBUMIN SERPL-MCNC: 3.5 G/DL (ref 3.2–5)
ALKALINE PHOSPHATASE: 94 U/L (ref 45–117)
AST(SGOT): 24 U/L (ref 15–37)
BILIRUB DIRECT SERPL-MCNC: 0.08 MG/DL (ref 0–0.3)
BUN SERPL-MCNC: 18 MG/DL (ref 7–18)
DEPRECATED RDW RBC: 47.8 FL (ref 35.1–43.9)
ERYTHROCYTE [DISTWIDTH] IN BLOOD: 14.3 % (ref 11.6–14.6)
GLOBULIN: 3 G/DL (ref 2.2–4.2)
HCT VFR BLD AUTO: 40.7 % (ref 37–47)
HEMOGLOBIN: 12.4 G/DL (ref 12–15)
HGB BLD-MCNC: 12.4 G/DL (ref 12–15)
MCV RBC: 90.6 FL (ref 81–99)
MEAN CORP HGB CONC: 30.5 G/DL (ref 32–36)
MEAN PLATELET VOL.: 10.6 FL (ref 6.2–12)
PLATELET # BLD: 147 K/MM3 (ref 150–450)
PLATELET COUNT: 147 K/MM3 (ref 150–450)
RBC # BLD AUTO: 4.49 M/MM3 (ref 4.2–5.4)
RBC DISTRIBUTION WIDTH CV: 14.3 % (ref 11.6–14.6)
RBC DISTRIBUTION WIDTH SD: 47.8 FL (ref 35.1–43.9)
WBC # BLD AUTO: 4.5 K/MM3 (ref 4.4–11)
WHITE BLOOD COUNT: 4.5 K/MM3 (ref 4.4–11)

## 2021-04-15 PROCEDURE — 84520 ASSAY OF UREA NITROGEN: CPT

## 2021-04-15 PROCEDURE — 85027 COMPLETE CBC AUTOMATED: CPT

## 2021-04-15 PROCEDURE — 36415 COLL VENOUS BLD VENIPUNCTURE: CPT

## 2021-04-15 PROCEDURE — 80076 HEPATIC FUNCTION PANEL: CPT

## 2021-05-24 ENCOUNTER — HOSPITAL ENCOUNTER (OUTPATIENT)
Age: 61
End: 2021-05-24
Payer: COMMERCIAL

## 2021-05-24 VITALS — BODY MASS INDEX: 25.9 KG/M2

## 2021-05-24 DIAGNOSIS — Z79.899: ICD-10-CM

## 2021-05-24 DIAGNOSIS — L40.8: Primary | ICD-10-CM

## 2021-05-24 LAB
ALANINE AMINOTRANSFER ALT/SGPT: 19 U/L (ref 13–56)
ALBUMIN SERPL-MCNC: 3.5 G/DL (ref 3.2–5)
ALKALINE PHOSPHATASE: 94 U/L (ref 45–117)
AST(SGOT): 27 U/L (ref 15–37)
BILIRUB DIRECT SERPL-MCNC: 0.08 MG/DL (ref 0–0.3)
DEPRECATED RDW RBC: 50.4 FL (ref 35.1–43.9)
ERYTHROCYTE [DISTWIDTH] IN BLOOD: 14.8 % (ref 11.6–14.6)
GLOBULIN: 2.5 G/DL (ref 2.2–4.2)
HCT VFR BLD AUTO: 40.9 % (ref 37–47)
HEMOGLOBIN: 12.4 G/DL (ref 12–15)
HGB BLD-MCNC: 12.4 G/DL (ref 12–15)
IMMATURE GRANULOCYTES COUNT: 0.03 X10^3/UL (ref 0–0)
MCV RBC: 91.7 FL (ref 81–99)
MEAN CORP HGB CONC: 30.3 G/DL (ref 32–36)
MEAN PLATELET VOL.: 10.2 FL (ref 6.2–12)
NRBC FLAGGED BY ANALYZER: 0 % (ref 0–5)
PLATELET # BLD: 148 K/MM3 (ref 150–450)
PLATELET COUNT: 148 K/MM3 (ref 150–450)
RBC # BLD AUTO: 4.46 M/MM3 (ref 4.2–5.4)
RBC DISTRIBUTION WIDTH CV: 14.8 % (ref 11.6–14.6)
RBC DISTRIBUTION WIDTH SD: 50.4 FL (ref 35.1–43.9)
WBC # BLD AUTO: 4.5 K/MM3 (ref 4.4–11)
WHITE BLOOD COUNT: 4.5 K/MM3 (ref 4.4–11)

## 2021-05-24 PROCEDURE — 36415 COLL VENOUS BLD VENIPUNCTURE: CPT

## 2021-05-24 PROCEDURE — 85025 COMPLETE CBC W/AUTO DIFF WBC: CPT

## 2021-05-24 PROCEDURE — 80076 HEPATIC FUNCTION PANEL: CPT

## 2021-07-16 ENCOUNTER — HOSPITAL ENCOUNTER (OUTPATIENT)
Dept: HOSPITAL 100 - LAB | Age: 61
Discharge: HOME | End: 2021-07-16
Payer: COMMERCIAL

## 2021-07-16 VITALS — BODY MASS INDEX: 25.9 KG/M2

## 2021-07-16 DIAGNOSIS — J45.20: ICD-10-CM

## 2021-07-16 DIAGNOSIS — J84.116: Primary | ICD-10-CM

## 2021-07-16 LAB
ALANINE AMINOTRANSFER ALT/SGPT: 21 U/L (ref 13–56)
ALBUMIN SERPL-MCNC: 3.4 G/DL (ref 3.2–5)
ALKALINE PHOSPHATASE: 72 U/L (ref 45–117)
AST(SGOT): 20 U/L (ref 15–37)
BILIRUB DIRECT SERPL-MCNC: 0.09 MG/DL (ref 0–0.3)
BUN SERPL-MCNC: 19 MG/DL (ref 7–18)
DEPRECATED RDW RBC: 47.9 FL (ref 35.1–43.9)
ERYTHROCYTE [DISTWIDTH] IN BLOOD: 14.2 % (ref 11.6–14.6)
GLOBULIN: 2.7 G/DL (ref 2.2–4.2)
HCT VFR BLD AUTO: 41 % (ref 37–47)
HEMOGLOBIN: 12.2 G/DL (ref 12–15)
HGB BLD-MCNC: 12.2 G/DL (ref 12–15)
MCV RBC: 91.5 FL (ref 81–99)
MEAN CORP HGB CONC: 29.8 G/DL (ref 32–36)
MEAN PLATELET VOL.: 10.1 FL (ref 6.2–12)
PLATELET # BLD: 159 K/MM3 (ref 150–450)
PLATELET COUNT: 159 K/MM3 (ref 150–450)
RBC # BLD AUTO: 4.48 M/MM3 (ref 4.2–5.4)
RBC DISTRIBUTION WIDTH CV: 14.2 % (ref 11.6–14.6)
RBC DISTRIBUTION WIDTH SD: 47.9 FL (ref 35.1–43.9)
WBC # BLD AUTO: 3 K/MM3 (ref 4.4–11)
WHITE BLOOD COUNT: 3 K/MM3 (ref 4.4–11)

## 2021-07-16 PROCEDURE — 85027 COMPLETE CBC AUTOMATED: CPT

## 2021-07-16 PROCEDURE — 36415 COLL VENOUS BLD VENIPUNCTURE: CPT

## 2021-07-16 PROCEDURE — 80076 HEPATIC FUNCTION PANEL: CPT

## 2021-07-16 PROCEDURE — 84520 ASSAY OF UREA NITROGEN: CPT

## 2021-08-20 ENCOUNTER — HOSPITAL ENCOUNTER (OUTPATIENT)
Age: 61
End: 2021-08-20
Payer: COMMERCIAL

## 2021-08-20 DIAGNOSIS — J84.116: Primary | ICD-10-CM

## 2021-08-20 DIAGNOSIS — J45.20: ICD-10-CM

## 2021-08-20 LAB
DEPRECATED RDW RBC: 47.1 FL (ref 35.1–43.9)
ERYTHROCYTE [DISTWIDTH] IN BLOOD: 13.8 % (ref 11.6–14.6)
HCT VFR BLD AUTO: 41.4 % (ref 37–47)
HEMOGLOBIN: 12.5 G/DL (ref 12–15)
HGB BLD-MCNC: 12.5 G/DL (ref 12–15)
IMMATURE GRANULOCYTES COUNT: 0.02 X10^3/UL (ref 0–0)
MCV RBC: 92.4 FL (ref 81–99)
MEAN CORP HGB CONC: 30.2 G/DL (ref 32–36)
MEAN PLATELET VOL.: 10.4 FL (ref 6.2–12)
NRBC FLAGGED BY ANALYZER: 0 % (ref 0–5)
PLATELET # BLD: 175 K/MM3 (ref 150–450)
PLATELET COUNT: 175 K/MM3 (ref 150–450)
RBC # BLD AUTO: 4.48 M/MM3 (ref 4.2–5.4)
RBC DISTRIBUTION WIDTH CV: 13.8 % (ref 11.6–14.6)
RBC DISTRIBUTION WIDTH SD: 47.1 FL (ref 35.1–43.9)
WBC # BLD AUTO: 5.9 K/MM3 (ref 4.4–11)
WHITE BLOOD COUNT: 5.9 K/MM3 (ref 4.4–11)

## 2021-08-20 PROCEDURE — 85025 COMPLETE CBC W/AUTO DIFF WBC: CPT

## 2021-08-20 PROCEDURE — 36415 COLL VENOUS BLD VENIPUNCTURE: CPT

## 2021-10-08 ENCOUNTER — HOSPITAL ENCOUNTER (OUTPATIENT)
Dept: HOSPITAL 100 - LAB | Age: 61
LOS: 23 days | Discharge: HOME | End: 2021-10-31
Payer: COMMERCIAL

## 2021-10-08 VITALS — BODY MASS INDEX: 25.9 KG/M2

## 2021-10-08 DIAGNOSIS — J84.116: Primary | ICD-10-CM

## 2021-10-08 DIAGNOSIS — J45.20: ICD-10-CM

## 2021-10-08 LAB
ALANINE AMINOTRANSFER ALT/SGPT: 23 U/L (ref 13–56)
ALBUMIN SERPL-MCNC: 3.2 G/DL (ref 3.2–5)
ALKALINE PHOSPHATASE: 78 U/L (ref 45–117)
AST(SGOT): 25 U/L (ref 15–37)
BILIRUB DIRECT SERPL-MCNC: 0.07 MG/DL (ref 0–0.3)
BUN SERPL-MCNC: 18 MG/DL (ref 7–18)
DEPRECATED RDW RBC: 47.5 FL (ref 35.1–43.9)
ERYTHROCYTE [DISTWIDTH] IN BLOOD: 14.4 % (ref 11.6–14.6)
GLOBULIN: 3 G/DL (ref 2.2–4.2)
HCT VFR BLD AUTO: 40.8 % (ref 37–47)
HEMOGLOBIN: 12.6 G/DL (ref 12–15)
HGB BLD-MCNC: 12.6 G/DL (ref 12–15)
MCV RBC: 90.1 FL (ref 81–99)
MEAN CORP HGB CONC: 30.9 G/DL (ref 32–36)
MEAN PLATELET VOL.: 9.8 FL (ref 6.2–12)
PLATELET # BLD: 171 K/MM3 (ref 150–450)
PLATELET COUNT: 171 K/MM3 (ref 150–450)
RBC # BLD AUTO: 4.53 M/MM3 (ref 4.2–5.4)
RBC DISTRIBUTION WIDTH CV: 14.4 % (ref 11.6–14.6)
RBC DISTRIBUTION WIDTH SD: 47.5 FL (ref 35.1–43.9)
WBC # BLD AUTO: 4.4 K/MM3 (ref 4.4–11)
WHITE BLOOD COUNT: 4.4 K/MM3 (ref 4.4–11)

## 2021-10-08 PROCEDURE — 80076 HEPATIC FUNCTION PANEL: CPT

## 2021-10-08 PROCEDURE — 85027 COMPLETE CBC AUTOMATED: CPT

## 2021-10-08 PROCEDURE — 84520 ASSAY OF UREA NITROGEN: CPT

## 2021-10-08 PROCEDURE — 36415 COLL VENOUS BLD VENIPUNCTURE: CPT

## 2021-10-12 ENCOUNTER — HOSPITAL ENCOUNTER (OUTPATIENT)
Age: 61
End: 2021-10-12
Payer: MEDICARE

## 2021-10-12 DIAGNOSIS — R10.31: ICD-10-CM

## 2021-10-12 DIAGNOSIS — M25.551: Primary | ICD-10-CM

## 2021-10-12 PROCEDURE — 73502 X-RAY EXAM HIP UNI 2-3 VIEWS: CPT

## 2021-11-17 ENCOUNTER — HOSPITAL ENCOUNTER (OUTPATIENT)
Age: 61
End: 2021-11-17
Payer: MEDICARE

## 2021-11-17 DIAGNOSIS — E21.3: ICD-10-CM

## 2021-11-17 DIAGNOSIS — M81.0: Primary | ICD-10-CM

## 2021-11-17 PROCEDURE — 77080 DXA BONE DENSITY AXIAL: CPT

## 2021-11-18 ENCOUNTER — HOSPITAL ENCOUNTER (OUTPATIENT)
Dept: HOSPITAL 100 - LABSPEC | Age: 61
Discharge: HOME | End: 2021-11-18
Payer: MEDICARE

## 2021-11-18 DIAGNOSIS — Z20.828: Primary | ICD-10-CM

## 2021-11-18 PROCEDURE — U0005 INFEC AGEN DETEC AMPLI PROBE: HCPCS

## 2021-11-18 PROCEDURE — 87635 SARS-COV-2 COVID-19 AMP PRB: CPT

## 2021-11-18 PROCEDURE — U0003 INFECTIOUS AGENT DETECTION BY NUCLEIC ACID (DNA OR RNA); SEVERE ACUTE RESPIRATORY SYNDROME CORONAVIRUS 2 (SARS-COV-2) (CORONAVIRUS DISEASE [COVID-19]), AMPLIFIED PROBE TECHNIQUE, MAKING USE OF HIGH THROUGHPUT TECHNOLOGIES AS DESCRIBED BY CMS-2020-01-R: HCPCS

## 2021-11-26 ENCOUNTER — HOSPITAL ENCOUNTER (OUTPATIENT)
Age: 61
End: 2021-11-26
Payer: MEDICARE

## 2021-11-26 DIAGNOSIS — E21.3: ICD-10-CM

## 2021-11-26 DIAGNOSIS — M81.0: ICD-10-CM

## 2021-11-26 DIAGNOSIS — E55.9: ICD-10-CM

## 2021-11-26 DIAGNOSIS — Z79.52: ICD-10-CM

## 2021-11-26 DIAGNOSIS — J84.116: Primary | ICD-10-CM

## 2021-11-26 LAB
ALANINE AMINOTRANSFER ALT/SGPT: 21 U/L (ref 13–56)
ALBUMIN SERPL-MCNC: 3.1 G/DL (ref 3.2–5)
ALKALINE PHOSPHATASE: 81 U/L (ref 45–117)
ANION GAP: 4 (ref 5–15)
AST(SGOT): 22 U/L (ref 15–37)
BUN SERPL-MCNC: 13 MG/DL (ref 7–18)
BUN/CREAT RATIO: 15.6 RATIO (ref 10–20)
CALCIUM SERPL-MCNC: 8.7 MG/DL (ref 8.5–10.1)
CARBON DIOXIDE: 28 MMOL/L (ref 21–32)
CHLORIDE: 109 MMOL/L (ref 98–107)
EST GLOM FILT RATE - AFR AMER: 89 ML/MIN (ref 60–?)
GLOBULIN: 3.1 G/DL (ref 2.2–4.2)
GLUCOSE: 78 MG/DL (ref 74–106)
POTASSIUM: 3.9 MMOL/L (ref 3.5–5.1)
PTHIN: 62.5 PG/ML (ref 18.4–80.1)
VITAMIN D,25 HYDROXY: 78.5 NG/ML

## 2021-11-26 PROCEDURE — 80053 COMPREHEN METABOLIC PANEL: CPT

## 2021-11-26 PROCEDURE — 36415 COLL VENOUS BLD VENIPUNCTURE: CPT

## 2021-11-26 PROCEDURE — 83036 HEMOGLOBIN GLYCOSYLATED A1C: CPT

## 2021-11-26 PROCEDURE — 84100 ASSAY OF PHOSPHORUS: CPT

## 2021-11-26 PROCEDURE — 84443 ASSAY THYROID STIM HORMONE: CPT

## 2021-11-26 PROCEDURE — 82306 VITAMIN D 25 HYDROXY: CPT

## 2021-11-26 PROCEDURE — 83970 ASSAY OF PARATHORMONE: CPT

## 2021-11-26 PROCEDURE — 82330 ASSAY OF CALCIUM: CPT

## 2021-12-27 ENCOUNTER — HOSPITAL ENCOUNTER (OUTPATIENT)
Age: 61
End: 2021-12-27
Payer: MEDICARE

## 2021-12-27 DIAGNOSIS — Z12.31: Primary | ICD-10-CM

## 2021-12-27 PROCEDURE — 77063 BREAST TOMOSYNTHESIS BI: CPT

## 2021-12-27 PROCEDURE — 77067 SCR MAMMO BI INCL CAD: CPT

## 2022-01-18 ENCOUNTER — HOSPITAL ENCOUNTER (OUTPATIENT)
Dept: HOSPITAL 100 - LAB | Age: 62
LOS: 13 days | Discharge: HOME | End: 2022-01-31
Payer: MEDICARE

## 2022-01-18 VITALS — BODY MASS INDEX: 25.9 KG/M2

## 2022-01-18 DIAGNOSIS — J45.20: Primary | ICD-10-CM

## 2022-01-18 DIAGNOSIS — J84.116: ICD-10-CM

## 2022-01-18 LAB
ALANINE AMINOTRANSFER ALT/SGPT: 24 U/L (ref 13–56)
ALBUMIN SERPL-MCNC: 3.6 G/DL (ref 3.2–5)
ALKALINE PHOSPHATASE: 79 U/L (ref 45–117)
AST(SGOT): 17 U/L (ref 15–37)
BILIRUB DIRECT SERPL-MCNC: 0.08 MG/DL (ref 0–0.3)
BUN SERPL-MCNC: 16 MG/DL (ref 7–18)
DEPRECATED RDW RBC: 47.3 FL (ref 35.1–43.9)
ERYTHROCYTE [DISTWIDTH] IN BLOOD: 14.1 % (ref 11.6–14.6)
GLOBULIN: 2.8 G/DL (ref 2.2–4.2)
HCT VFR BLD AUTO: 43 % (ref 37–47)
HEMOGLOBIN: 13.3 G/DL (ref 12–15)
HGB BLD-MCNC: 13.3 G/DL (ref 12–15)
MCV RBC: 92.9 FL (ref 81–99)
MEAN CORP HGB CONC: 30.9 G/DL (ref 32–36)
MEAN PLATELET VOL.: 10.6 FL (ref 6.2–12)
PLATELET # BLD: 152 K/MM3 (ref 150–450)
PLATELET COUNT: 152 K/MM3 (ref 150–450)
RBC # BLD AUTO: 4.63 M/MM3 (ref 4.2–5.4)
RBC DISTRIBUTION WIDTH CV: 14.1 % (ref 11.6–14.6)
RBC DISTRIBUTION WIDTH SD: 47.3 FL (ref 35.1–43.9)
WBC # BLD AUTO: 5.5 K/MM3 (ref 4.4–11)
WHITE BLOOD COUNT: 5.5 K/MM3 (ref 4.4–11)

## 2022-01-18 PROCEDURE — 85027 COMPLETE CBC AUTOMATED: CPT

## 2022-01-18 PROCEDURE — 36415 COLL VENOUS BLD VENIPUNCTURE: CPT

## 2022-01-18 PROCEDURE — 84520 ASSAY OF UREA NITROGEN: CPT

## 2022-01-18 PROCEDURE — 80076 HEPATIC FUNCTION PANEL: CPT

## 2022-01-25 ENCOUNTER — HOSPITAL ENCOUNTER (OUTPATIENT)
Dept: HOSPITAL 100 - PSN | Age: 62
Discharge: ANOTHER HOSPITAL | End: 2022-01-25
Payer: MEDICARE

## 2022-01-25 DIAGNOSIS — U07.1: Primary | ICD-10-CM

## 2022-01-25 PROCEDURE — U0003 INFECTIOUS AGENT DETECTION BY NUCLEIC ACID (DNA OR RNA); SEVERE ACUTE RESPIRATORY SYNDROME CORONAVIRUS 2 (SARS-COV-2) (CORONAVIRUS DISEASE [COVID-19]), AMPLIFIED PROBE TECHNIQUE, MAKING USE OF HIGH THROUGHPUT TECHNOLOGIES AS DESCRIBED BY CMS-2020-01-R: HCPCS

## 2022-01-25 PROCEDURE — 87804 INFLUENZA ASSAY W/OPTIC: CPT

## 2022-01-25 PROCEDURE — C9803 HOPD COVID-19 SPEC COLLECT: HCPCS

## 2022-01-25 PROCEDURE — 87635 SARS-COV-2 COVID-19 AMP PRB: CPT

## 2022-01-25 PROCEDURE — 87807 RSV ASSAY W/OPTIC: CPT

## 2022-01-25 PROCEDURE — U0005 INFEC AGEN DETEC AMPLI PROBE: HCPCS

## 2022-01-26 ENCOUNTER — HOSPITAL ENCOUNTER (OUTPATIENT)
Dept: HOSPITAL 100 - MS3OUT | Age: 62
Discharge: HOME | End: 2022-01-26
Payer: MEDICARE

## 2022-01-26 VITALS
HEART RATE: 84 BPM | OXYGEN SATURATION: 99 % | TEMPERATURE: 98.5 F | RESPIRATION RATE: 16 BRPM | DIASTOLIC BLOOD PRESSURE: 62 MMHG | SYSTOLIC BLOOD PRESSURE: 110 MMHG

## 2022-01-26 VITALS
HEART RATE: 79 BPM | OXYGEN SATURATION: 98 % | TEMPERATURE: 97.6 F | SYSTOLIC BLOOD PRESSURE: 129 MMHG | RESPIRATION RATE: 16 BRPM | DIASTOLIC BLOOD PRESSURE: 67 MMHG

## 2022-01-26 VITALS
HEART RATE: 105 BPM | RESPIRATION RATE: 16 BRPM | DIASTOLIC BLOOD PRESSURE: 66 MMHG | OXYGEN SATURATION: 98 % | SYSTOLIC BLOOD PRESSURE: 124 MMHG | TEMPERATURE: 98.24 F

## 2022-01-26 VITALS — BODY MASS INDEX: 26.6 KG/M2

## 2022-01-26 DIAGNOSIS — U07.1: ICD-10-CM

## 2022-01-26 DIAGNOSIS — Z23: Primary | ICD-10-CM

## 2022-01-26 PROCEDURE — A4216 STERILE WATER/SALINE, 10 ML: HCPCS

## 2022-01-26 PROCEDURE — M0247: HCPCS

## 2022-01-26 RX ADMIN — SODIUM CHLORIDE, PRESERVATIVE FREE 0 ML: 5 INJECTION INTRAVENOUS at 14:54

## 2022-03-21 ENCOUNTER — HOSPITAL ENCOUNTER (EMERGENCY)
Age: 62
Discharge: HOME | End: 2022-03-21
Payer: MEDICARE

## 2022-03-21 VITALS
DIASTOLIC BLOOD PRESSURE: 73 MMHG | OXYGEN SATURATION: 96 % | SYSTOLIC BLOOD PRESSURE: 146 MMHG | HEART RATE: 87 BPM | RESPIRATION RATE: 18 BRPM | BODY MASS INDEX: 27.1 KG/M2 | TEMPERATURE: 96.6 F

## 2022-03-21 VITALS — OXYGEN SATURATION: 99 % | RESPIRATION RATE: 16 BRPM | HEART RATE: 75 BPM

## 2022-03-21 DIAGNOSIS — S50.361A: Primary | ICD-10-CM
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
## 2022-03-21 PROCEDURE — 99285 EMERGENCY DEPT VISIT HI MDM: CPT

## 2022-03-21 PROCEDURE — A4216 STERILE WATER/SALINE, 10 ML: HCPCS

## 2022-04-22 ENCOUNTER — HOSPITAL ENCOUNTER (OUTPATIENT)
Dept: HOSPITAL 100 - MEDOUTP | Age: 62
Discharge: HOME | End: 2022-04-22
Payer: MEDICARE

## 2022-04-22 VITALS
OXYGEN SATURATION: 98 % | HEART RATE: 71 BPM | DIASTOLIC BLOOD PRESSURE: 81 MMHG | SYSTOLIC BLOOD PRESSURE: 129 MMHG | RESPIRATION RATE: 16 BRPM | TEMPERATURE: 96.4 F

## 2022-04-22 VITALS
RESPIRATION RATE: 16 BRPM | HEART RATE: 78 BPM | DIASTOLIC BLOOD PRESSURE: 77 MMHG | SYSTOLIC BLOOD PRESSURE: 135 MMHG | OXYGEN SATURATION: 99 %

## 2022-04-22 DIAGNOSIS — Z79.899: Primary | ICD-10-CM

## 2022-04-22 LAB
CORTIS SERPL-MCNC: 15.1 UG/DL (ref 3.44–22.45)
CORTIS SERPL-MCNC: 18.3 UG/DL (ref 3.44–22.45)
CORTIS SERPL-MCNC: 9.4 UG/DL (ref 3.44–22.45)

## 2022-04-22 PROCEDURE — 82533 TOTAL CORTISOL: CPT

## 2022-04-22 PROCEDURE — 36415 COLL VENOUS BLD VENIPUNCTURE: CPT

## 2022-04-22 PROCEDURE — 96372 THER/PROPH/DIAG INJ SC/IM: CPT

## 2022-04-26 ENCOUNTER — HOSPITAL ENCOUNTER (OUTPATIENT)
Dept: HOSPITAL 100 - LAB | Age: 62
Discharge: HOME | End: 2022-04-26
Payer: MEDICARE

## 2022-04-26 VITALS — BODY MASS INDEX: 25.9 KG/M2

## 2022-04-26 DIAGNOSIS — J45.20: Primary | ICD-10-CM

## 2022-04-26 DIAGNOSIS — J84.116: ICD-10-CM

## 2022-04-26 LAB
ALANINE AMINOTRANSFER ALT/SGPT: 23 U/L (ref 13–56)
ALBUMIN SERPL-MCNC: 3.1 G/DL (ref 3.2–5)
ALKALINE PHOSPHATASE: 86 U/L (ref 45–117)
AST(SGOT): 28 U/L (ref 15–37)
BILIRUB DIRECT SERPL-MCNC: 0.1 MG/DL (ref 0–0.3)
BUN SERPL-MCNC: 11 MG/DL (ref 7–18)
DEPRECATED RDW RBC: 50.4 FL (ref 35.1–43.9)
ERYTHROCYTE [DISTWIDTH] IN BLOOD: 14.9 % (ref 11.6–14.6)
GLOBULIN: 2.8 G/DL (ref 2.2–4.2)
HCT VFR BLD AUTO: 38.3 % (ref 37–47)
HEMOGLOBIN: 11.9 G/DL (ref 12–15)
HGB BLD-MCNC: 11.9 G/DL (ref 12–15)
MCV RBC: 92.7 FL (ref 81–99)
MEAN CORP HGB CONC: 31.1 G/DL (ref 32–36)
MEAN PLATELET VOL.: 10.5 FL (ref 6.2–12)
PLATELET # BLD: 150 K/MM3 (ref 150–450)
PLATELET COUNT: 150 K/MM3 (ref 150–450)
RBC # BLD AUTO: 4.13 M/MM3 (ref 4.2–5.4)
RBC DISTRIBUTION WIDTH CV: 14.9 % (ref 11.6–14.6)
RBC DISTRIBUTION WIDTH SD: 50.4 FL (ref 35.1–43.9)
WBC # BLD AUTO: 4.5 K/MM3 (ref 4.4–11)
WHITE BLOOD COUNT: 4.5 K/MM3 (ref 4.4–11)

## 2022-04-26 PROCEDURE — 85027 COMPLETE CBC AUTOMATED: CPT

## 2022-04-26 PROCEDURE — 36415 COLL VENOUS BLD VENIPUNCTURE: CPT

## 2022-04-26 PROCEDURE — 84520 ASSAY OF UREA NITROGEN: CPT

## 2022-04-26 PROCEDURE — 80076 HEPATIC FUNCTION PANEL: CPT

## 2022-08-24 ENCOUNTER — HOSPITAL ENCOUNTER (OUTPATIENT)
Dept: HOSPITAL 100 - LAB | Age: 62
Discharge: HOME | End: 2022-08-24
Payer: MEDICARE

## 2022-08-24 DIAGNOSIS — M81.8: ICD-10-CM

## 2022-08-24 DIAGNOSIS — J84.116: ICD-10-CM

## 2022-08-24 DIAGNOSIS — R82.90: Primary | ICD-10-CM

## 2022-08-24 LAB
LEUKOCYTE ESTERASE UR QL STRIP: 25 /UL
SP GR UR: 1.02 (ref 1–1.03)
URINE PRESERVATIVE: (no result)

## 2022-08-24 PROCEDURE — 87088 URINE BACTERIA CULTURE: CPT

## 2022-08-24 PROCEDURE — 36415 COLL VENOUS BLD VENIPUNCTURE: CPT

## 2022-08-24 PROCEDURE — 81002 URINALYSIS NONAUTO W/O SCOPE: CPT

## 2022-08-24 PROCEDURE — 82164 ANGIOTENSIN I ENZYME TEST: CPT

## 2022-08-24 PROCEDURE — 87086 URINE CULTURE/COLONY COUNT: CPT

## 2022-08-26 ENCOUNTER — HOSPITAL ENCOUNTER (OUTPATIENT)
Age: 62
Discharge: HOME | End: 2022-08-26
Payer: MEDICARE

## 2022-08-26 DIAGNOSIS — R30.0: Primary | ICD-10-CM

## 2022-08-26 LAB
ACE SERPL-CCNC: 92 U/L (ref 14–82)
ANGIOTENSIN CONVERT ENZYME: 92 U/L (ref 14–82)

## 2022-08-26 PROCEDURE — 87088 URINE BACTERIA CULTURE: CPT

## 2022-08-26 PROCEDURE — 87086 URINE CULTURE/COLONY COUNT: CPT

## 2022-09-08 ENCOUNTER — HOSPITAL ENCOUNTER (OUTPATIENT)
Dept: HOSPITAL 100 - LAB | Age: 62
Discharge: HOME | End: 2022-09-08
Payer: MEDICARE

## 2022-09-08 DIAGNOSIS — J84.116: ICD-10-CM

## 2022-09-08 DIAGNOSIS — M81.0: ICD-10-CM

## 2022-09-08 DIAGNOSIS — E55.9: ICD-10-CM

## 2022-09-08 DIAGNOSIS — E21.3: Primary | ICD-10-CM

## 2022-09-08 DIAGNOSIS — Z79.52: ICD-10-CM

## 2022-09-08 LAB
ALANINE AMINOTRANSFER ALT/SGPT: 17 U/L (ref 13–56)
ALBUMIN SERPL-MCNC: 3.6 G/DL (ref 3.2–5)
ALKALINE PHOSPHATASE: 77 U/L (ref 45–117)
ANION GAP: 6 (ref 5–15)
AST(SGOT): 24 U/L (ref 15–37)
BUN SERPL-MCNC: 13 MG/DL (ref 7–18)
BUN/CREAT RATIO: 17.2 RATIO (ref 10–20)
CALCIUM SERPL-MCNC: 9 MG/DL (ref 8.5–10.1)
CARBON DIOXIDE: 29 MMOL/L (ref 21–32)
CHLORIDE: 106 MMOL/L (ref 98–107)
EST GLOM FILT RATE - AFR AMER: 99 ML/MIN (ref 60–?)
GLOBULIN: 2.7 G/DL (ref 2.2–4.2)
GLUCOSE: 80 MG/DL (ref 74–106)
POTASSIUM: 4 MMOL/L (ref 3.5–5.1)
PTHIN: 89.1 PG/ML (ref 18.4–80.1)
VITAMIN D,25 HYDROXY: 67.8 NG/ML

## 2022-09-08 PROCEDURE — 84443 ASSAY THYROID STIM HORMONE: CPT

## 2022-09-08 PROCEDURE — 80053 COMPREHEN METABOLIC PANEL: CPT

## 2022-09-08 PROCEDURE — 36415 COLL VENOUS BLD VENIPUNCTURE: CPT

## 2022-09-08 PROCEDURE — 83970 ASSAY OF PARATHORMONE: CPT

## 2022-09-08 PROCEDURE — 82330 ASSAY OF CALCIUM: CPT

## 2022-09-08 PROCEDURE — 82306 VITAMIN D 25 HYDROXY: CPT

## 2022-09-08 PROCEDURE — 84100 ASSAY OF PHOSPHORUS: CPT

## 2022-10-17 ENCOUNTER — HOSPITAL ENCOUNTER (OUTPATIENT)
Dept: HOSPITAL 100 - LAB | Age: 62
Discharge: HOME | End: 2022-10-17
Payer: MEDICARE

## 2022-10-17 DIAGNOSIS — M81.8: ICD-10-CM

## 2022-10-17 DIAGNOSIS — J45.20: Primary | ICD-10-CM

## 2022-10-17 LAB
ALKALINE PHOSPHATASE: 71 U/L (ref 45–117)
CALCIUM 24H UR-MCNC: < 5 MG/DL
CALCIUM SERPL-MCNC: 9 MG/DL (ref 8.5–10.1)
CRP SERPL-MCNC: < 2.9 MG/L (ref 0–3)
DEPRECATED RDW RBC: 46.7 FL (ref 35.1–43.9)
ERYTHROCYTE [DISTWIDTH] IN BLOOD: 13.9 % (ref 11.6–14.6)
HCT VFR BLD AUTO: 39 % (ref 37–47)
HEMOGLOBIN: 11.8 G/DL (ref 12–15)
HGB BLD-MCNC: 11.8 G/DL (ref 12–15)
IMMATURE GRANULOCYTES COUNT: 0.02 X10^3/UL (ref 0–0)
MCV RBC: 92.9 FL (ref 81–99)
MEAN CORP HGB CONC: 30.3 G/DL (ref 32–36)
MEAN PLATELET VOL.: 11.3 FL (ref 6.2–12)
NRBC FLAGGED BY ANALYZER: 0 % (ref 0–5)
PLATELET # BLD: 145 K/MM3 (ref 150–450)
PLATELET COUNT: 145 K/MM3 (ref 150–450)
RBC # BLD AUTO: 4.2 M/MM3 (ref 4.2–5.4)
RBC DISTRIBUTION WIDTH CV: 13.9 % (ref 11.6–14.6)
RBC DISTRIBUTION WIDTH SD: 46.7 FL (ref 35.1–43.9)
WBC # BLD AUTO: 4.1 K/MM3 (ref 4.4–11)
WHITE BLOOD COUNT: 4.1 K/MM3 (ref 4.4–11)

## 2022-10-17 PROCEDURE — 82330 ASSAY OF CALCIUM: CPT

## 2022-10-17 PROCEDURE — 86140 C-REACTIVE PROTEIN: CPT

## 2022-10-17 PROCEDURE — 85025 COMPLETE CBC W/AUTO DIFF WBC: CPT

## 2022-10-17 PROCEDURE — 82340 ASSAY OF CALCIUM IN URINE: CPT

## 2022-10-17 PROCEDURE — 84075 ASSAY ALKALINE PHOSPHATASE: CPT

## 2022-10-17 PROCEDURE — 36415 COLL VENOUS BLD VENIPUNCTURE: CPT

## 2022-10-17 PROCEDURE — 85652 RBC SED RATE AUTOMATED: CPT

## 2022-10-17 PROCEDURE — 82310 ASSAY OF CALCIUM: CPT

## 2022-12-31 ENCOUNTER — HOSPITAL ENCOUNTER (EMERGENCY)
Age: 62
Discharge: HOME | End: 2022-12-31
Payer: MEDICARE

## 2022-12-31 VITALS
DIASTOLIC BLOOD PRESSURE: 82 MMHG | RESPIRATION RATE: 18 BRPM | OXYGEN SATURATION: 96 % | HEART RATE: 103 BPM | SYSTOLIC BLOOD PRESSURE: 138 MMHG

## 2022-12-31 VITALS
OXYGEN SATURATION: 97 % | DIASTOLIC BLOOD PRESSURE: 76 MMHG | SYSTOLIC BLOOD PRESSURE: 123 MMHG | RESPIRATION RATE: 17 BRPM | HEART RATE: 103 BPM

## 2022-12-31 VITALS
HEART RATE: 112 BPM | TEMPERATURE: 98.8 F | RESPIRATION RATE: 18 BRPM | OXYGEN SATURATION: 93 % | BODY MASS INDEX: 25.7 KG/M2

## 2022-12-31 DIAGNOSIS — R51.9: ICD-10-CM

## 2022-12-31 DIAGNOSIS — J10.1: Primary | ICD-10-CM

## 2022-12-31 DIAGNOSIS — R19.7: ICD-10-CM

## 2022-12-31 DIAGNOSIS — Z20.822: ICD-10-CM

## 2022-12-31 DIAGNOSIS — J45.909: ICD-10-CM

## 2022-12-31 LAB
ALANINE AMINOTRANSFER ALT/SGPT: 17 U/L (ref 13–56)
ALBUMIN SERPL-MCNC: 3.2 G/DL (ref 3.2–5)
ALKALINE PHOSPHATASE: 52 U/L (ref 45–117)
ANION GAP: 4 (ref 5–15)
AST(SGOT): 22 U/L (ref 15–37)
BUN SERPL-MCNC: 19 MG/DL (ref 7–18)
BUN/CREAT RATIO: 25.2 RATIO (ref 10–20)
CALCIUM SERPL-MCNC: 8.2 MG/DL (ref 8.5–10.1)
CARBON DIOXIDE: 28 MMOL/L (ref 21–32)
CHLORIDE: 106 MMOL/L (ref 98–107)
DEPRECATED RDW RBC: 46.6 FL (ref 35.1–43.9)
ERYTHROCYTE [DISTWIDTH] IN BLOOD: 13.7 % (ref 11.6–14.6)
EST GLOM FILT RATE - AFR AMER: 100 ML/MIN (ref 60–?)
ESTIMATED CREATININE CLEARANCE: 66.3 ML/MIN
GLOBULIN: 2.7 G/DL (ref 2.2–4.2)
GLUCOSE: 98 MG/DL (ref 74–106)
HCT VFR BLD AUTO: 38.4 % (ref 37–47)
HEMOGLOBIN: 11.6 G/DL (ref 12–15)
HGB BLD-MCNC: 11.6 G/DL (ref 12–15)
IMMATURE GRANULOCYTES COUNT: 0.02 X10^3/UL (ref 0–0)
MCV RBC: 93 FL (ref 81–99)
MEAN CORP HGB CONC: 30.2 G/DL (ref 32–36)
MEAN PLATELET VOL.: 10.7 FL (ref 6.2–12)
NRBC FLAGGED BY ANALYZER: 0 % (ref 0–5)
PLATELET # BLD: 121 K/MM3 (ref 150–450)
PLATELET COUNT: 121 K/MM3 (ref 150–450)
POTASSIUM: 3.6 MMOL/L (ref 3.5–5.1)
RBC # BLD AUTO: 4.13 M/MM3 (ref 4.2–5.4)
RBC DISTRIBUTION WIDTH CV: 13.7 % (ref 11.6–14.6)
RBC DISTRIBUTION WIDTH SD: 46.6 FL (ref 35.1–43.9)
WBC # BLD AUTO: 3.8 K/MM3 (ref 4.4–11)
WHITE BLOOD COUNT: 3.8 K/MM3 (ref 4.4–11)

## 2022-12-31 PROCEDURE — 94640 AIRWAY INHALATION TREATMENT: CPT

## 2022-12-31 PROCEDURE — A4216 STERILE WATER/SALINE, 10 ML: HCPCS

## 2022-12-31 PROCEDURE — 80053 COMPREHEN METABOLIC PANEL: CPT

## 2022-12-31 PROCEDURE — 85025 COMPLETE CBC W/AUTO DIFF WBC: CPT

## 2022-12-31 PROCEDURE — 87428 SARSCOV & INF VIR A&B AG IA: CPT

## 2022-12-31 PROCEDURE — 71046 X-RAY EXAM CHEST 2 VIEWS: CPT

## 2022-12-31 PROCEDURE — 99283 EMERGENCY DEPT VISIT LOW MDM: CPT

## 2023-01-11 ENCOUNTER — HOSPITAL ENCOUNTER (OUTPATIENT)
Dept: HOSPITAL 100 - OPBI | Age: 63
Discharge: HOME | End: 2023-01-11
Payer: MEDICARE

## 2023-01-11 DIAGNOSIS — Z12.31: Primary | ICD-10-CM

## 2023-01-11 PROCEDURE — 77063 BREAST TOMOSYNTHESIS BI: CPT

## 2023-01-11 PROCEDURE — 77067 SCR MAMMO BI INCL CAD: CPT

## 2023-03-10 ENCOUNTER — HOSPITAL ENCOUNTER (OUTPATIENT)
Dept: HOSPITAL 100 - LAB | Age: 63
Discharge: HOME | End: 2023-03-10
Payer: MEDICARE

## 2023-03-10 DIAGNOSIS — Z79.52: ICD-10-CM

## 2023-03-10 DIAGNOSIS — E55.9: ICD-10-CM

## 2023-03-10 DIAGNOSIS — J84.116: ICD-10-CM

## 2023-03-10 DIAGNOSIS — E21.3: Primary | ICD-10-CM

## 2023-03-10 DIAGNOSIS — M81.0: ICD-10-CM

## 2023-03-10 DIAGNOSIS — E03.9: ICD-10-CM

## 2023-03-10 LAB
ALANINE AMINOTRANSFER ALT/SGPT: 24 U/L (ref 13–56)
ALBUMIN SERPL-MCNC: 3.8 G/DL (ref 3.2–5)
ALKALINE PHOSPHATASE: 63 U/L (ref 45–117)
ANION GAP: 8 (ref 5–15)
AST(SGOT): 26 U/L (ref 15–37)
BUN SERPL-MCNC: 13 MG/DL (ref 7–18)
BUN/CREAT RATIO: 17.5 RATIO (ref 10–20)
CALCIUM 24H UR-MCNC: < 5 MG/DL
CALCIUM SERPL-MCNC: 9 MG/DL (ref 8.5–10.1)
CARBON DIOXIDE: 27 MMOL/L (ref 21–32)
CHLORIDE: 104 MMOL/L (ref 98–107)
CREAT UR-MCNC: 14.8 MG/DL
EST GLOM FILT RATE - AFR AMER: 102 ML/MIN (ref 60–?)
FREE T3: 2.8 PG/ML (ref 2.18–3.98)
GLOBULIN: 2.7 G/DL (ref 2.2–4.2)
GLUCOSE: 85 MG/DL (ref 74–106)
MAGNESIUM: 2.1 MG/DL (ref 1.6–2.6)
POTASSIUM: 4.1 MMOL/L (ref 3.5–5.1)
PTHIN: 81.4 PG/ML (ref 18.4–80.1)
VITAMIN D,25 HYDROXY: 61.4 NG/ML

## 2023-03-10 PROCEDURE — 82652 VIT D 1 25-DIHYDROXY: CPT

## 2023-03-10 PROCEDURE — 84439 ASSAY OF FREE THYROXINE: CPT

## 2023-03-10 PROCEDURE — 84100 ASSAY OF PHOSPHORUS: CPT

## 2023-03-10 PROCEDURE — 82330 ASSAY OF CALCIUM: CPT

## 2023-03-10 PROCEDURE — 84443 ASSAY THYROID STIM HORMONE: CPT

## 2023-03-10 PROCEDURE — 80053 COMPREHEN METABOLIC PANEL: CPT

## 2023-03-10 PROCEDURE — 36415 COLL VENOUS BLD VENIPUNCTURE: CPT

## 2023-03-10 PROCEDURE — 82306 VITAMIN D 25 HYDROXY: CPT

## 2023-03-10 PROCEDURE — 83970 ASSAY OF PARATHORMONE: CPT

## 2023-03-10 PROCEDURE — 83735 ASSAY OF MAGNESIUM: CPT

## 2023-03-10 PROCEDURE — 84481 FREE ASSAY (FT-3): CPT

## 2023-03-10 PROCEDURE — 82340 ASSAY OF CALCIUM IN URINE: CPT

## 2023-03-10 PROCEDURE — 82570 ASSAY OF URINE CREATININE: CPT

## 2023-03-13 LAB — 1,25(OH)2D3 SERPL-MCNC: 84.2 PG/ML (ref 24.8–81.5)

## 2023-06-06 ENCOUNTER — HOSPITAL ENCOUNTER (OUTPATIENT)
Dept: HOSPITAL 100 - LAB | Age: 63
Discharge: HOME | End: 2023-06-06
Payer: MEDICARE

## 2023-06-06 DIAGNOSIS — R06.00: ICD-10-CM

## 2023-06-06 DIAGNOSIS — J45.20: ICD-10-CM

## 2023-06-06 DIAGNOSIS — J84.116: Primary | ICD-10-CM

## 2023-06-06 LAB
ALANINE AMINOTRANSFER ALT/SGPT: 17 U/L (ref 13–56)
ALBUMIN SERPL-MCNC: 3.6 G/DL (ref 3.2–5)
ALKALINE PHOSPHATASE: 77 U/L (ref 45–117)
AST(SGOT): 22 U/L (ref 15–37)
BILIRUB DIRECT SERPL-MCNC: 0.09 MG/DL (ref 0–0.3)
CRP SERPL-MCNC: < 2.9 MG/L (ref 0–3)
DEPRECATED RDW RBC: 47.1 FL (ref 35.1–43.9)
ERYTHROCYTE [DISTWIDTH] IN BLOOD: 13.9 % (ref 11.6–14.6)
GLOBULIN: 2.6 G/DL (ref 2.2–4.2)
HCT VFR BLD AUTO: 41.2 % (ref 37–47)
HEMOGLOBIN: 12.4 G/DL (ref 12–15)
HGB BLD-MCNC: 12.4 G/DL (ref 12–15)
MCV RBC: 93.8 FL (ref 81–99)
MEAN CORP HGB CONC: 30.1 G/DL (ref 32–36)
MEAN PLATELET VOL.: 10.8 FL (ref 6.2–12)
PLATELET # BLD: 121 K/MM3 (ref 150–450)
PLATELET COUNT: 121 K/MM3 (ref 150–450)
RBC # BLD AUTO: 4.39 M/MM3 (ref 4.2–5.4)
RBC DISTRIBUTION WIDTH CV: 13.9 % (ref 11.6–14.6)
RBC DISTRIBUTION WIDTH SD: 47.1 FL (ref 35.1–43.9)
WBC # BLD AUTO: 4.1 K/MM3 (ref 4.4–11)
WHITE BLOOD COUNT: 4.1 K/MM3 (ref 4.4–11)

## 2023-06-06 PROCEDURE — 36415 COLL VENOUS BLD VENIPUNCTURE: CPT

## 2023-06-06 PROCEDURE — 86140 C-REACTIVE PROTEIN: CPT

## 2023-06-06 PROCEDURE — 80076 HEPATIC FUNCTION PANEL: CPT

## 2023-06-06 PROCEDURE — 85027 COMPLETE CBC AUTOMATED: CPT

## 2023-08-08 ENCOUNTER — HOSPITAL ENCOUNTER (OUTPATIENT)
Dept: HOSPITAL 100 - LAB | Age: 63
Discharge: HOME | End: 2023-08-08
Payer: MEDICARE

## 2023-08-08 DIAGNOSIS — Z79.899: Primary | ICD-10-CM

## 2023-08-08 LAB
ALANINE AMINOTRANSFER ALT/SGPT: 21 U/L (ref 13–56)
ALBUMIN SERPL-MCNC: 3.3 G/DL (ref 3.2–5)
ALKALINE PHOSPHATASE: 65 U/L (ref 45–117)
AST(SGOT): 18 U/L (ref 15–37)
BILIRUB DIRECT SERPL-MCNC: 0.1 MG/DL (ref 0–0.3)
DEPRECATED RDW RBC: 46.9 FL (ref 35.1–43.9)
ERYTHROCYTE [DISTWIDTH] IN BLOOD: 13.8 % (ref 11.6–14.6)
GLOBULIN: 2.5 G/DL (ref 2.2–4.2)
HCT VFR BLD AUTO: 40.6 % (ref 37–47)
HEMOGLOBIN: 13.1 G/DL (ref 12–15)
HGB BLD-MCNC: 13.1 G/DL (ref 12–15)
IMMATURE GRANULOCYTES COUNT: 0.01 X10^3/UL (ref 0–0)
MCV RBC: 92.1 FL (ref 81–99)
MEAN CORP HGB CONC: 32.3 G/DL (ref 32–36)
MEAN PLATELET VOL.: 10.9 FL (ref 6.2–12)
NRBC FLAGGED BY ANALYZER: 0 % (ref 0–5)
PLATELET # BLD: 118 K/MM3 (ref 150–450)
PLATELET COUNT: 118 K/MM3 (ref 150–450)
RBC # BLD AUTO: 4.41 M/MM3 (ref 4.2–5.4)
RBC DISTRIBUTION WIDTH CV: 13.8 % (ref 11.6–14.6)
RBC DISTRIBUTION WIDTH SD: 46.9 FL (ref 35.1–43.9)
WBC # BLD AUTO: 3.5 K/MM3 (ref 4.4–11)
WHITE BLOOD COUNT: 3.5 K/MM3 (ref 4.4–11)

## 2023-08-08 PROCEDURE — 80076 HEPATIC FUNCTION PANEL: CPT

## 2023-08-08 PROCEDURE — 36415 COLL VENOUS BLD VENIPUNCTURE: CPT

## 2023-08-08 PROCEDURE — 85025 COMPLETE CBC W/AUTO DIFF WBC: CPT

## 2023-08-22 ENCOUNTER — HOSPITAL ENCOUNTER (OUTPATIENT)
Dept: HOSPITAL 100 - LAB | Age: 63
Discharge: HOME | End: 2023-08-22
Payer: MEDICARE

## 2023-08-22 DIAGNOSIS — Z79.899: Primary | ICD-10-CM

## 2023-08-22 LAB
DEPRECATED RDW RBC: 47.8 FL (ref 35.1–43.9)
ERYTHROCYTE [DISTWIDTH] IN BLOOD: 14 % (ref 11.6–14.6)
HCT VFR BLD AUTO: 40.2 % (ref 37–47)
HEMOGLOBIN: 12.5 G/DL (ref 12–15)
HGB BLD-MCNC: 12.5 G/DL (ref 12–15)
IMMATURE GRANULOCYTES COUNT: 0.01 X10^3/UL (ref 0–0)
MCV RBC: 94.1 FL (ref 81–99)
MEAN CORP HGB CONC: 31.1 G/DL (ref 32–36)
MEAN PLATELET VOL.: 10.5 FL (ref 6.2–12)
NRBC FLAGGED BY ANALYZER: 0 % (ref 0–5)
PLATELET # BLD: 134 K/MM3 (ref 150–450)
PLATELET COUNT: 134 K/MM3 (ref 150–450)
RBC # BLD AUTO: 4.27 M/MM3 (ref 4.2–5.4)
RBC DISTRIBUTION WIDTH CV: 14 % (ref 11.6–14.6)
RBC DISTRIBUTION WIDTH SD: 47.8 FL (ref 35.1–43.9)
WBC # BLD AUTO: 3.9 K/MM3 (ref 4.4–11)
WHITE BLOOD COUNT: 3.9 K/MM3 (ref 4.4–11)

## 2023-08-22 PROCEDURE — 36415 COLL VENOUS BLD VENIPUNCTURE: CPT

## 2023-08-22 PROCEDURE — 85025 COMPLETE CBC W/AUTO DIFF WBC: CPT

## 2023-09-13 ENCOUNTER — HOSPITAL ENCOUNTER (OUTPATIENT)
Dept: HOSPITAL 100 - LAB | Age: 63
Discharge: HOME | End: 2023-09-13
Payer: MEDICARE

## 2023-09-13 DIAGNOSIS — R06.02: Primary | ICD-10-CM

## 2023-09-13 LAB
DEPRECATED RDW RBC: 48.3 FL (ref 35.1–43.9)
ERYTHROCYTE [DISTWIDTH] IN BLOOD: 13.6 % (ref 11.6–14.6)
HCT VFR BLD AUTO: 41.7 % (ref 37–47)
HEMOGLOBIN: 12.6 G/DL (ref 12–15)
HGB BLD-MCNC: 12.6 G/DL (ref 12–15)
MCV RBC: 97 FL (ref 81–99)
MEAN CORP HGB CONC: 30.2 G/DL (ref 32–36)
MEAN PLATELET VOL.: 11.1 FL (ref 6.2–12)
PLATELET # BLD: 139 K/MM3 (ref 150–450)
PLATELET COUNT: 139 K/MM3 (ref 150–450)
RBC # BLD AUTO: 4.3 M/MM3 (ref 4.2–5.4)
RBC DISTRIBUTION WIDTH CV: 13.6 % (ref 11.6–14.6)
RBC DISTRIBUTION WIDTH SD: 48.3 FL (ref 35.1–43.9)
WBC # BLD AUTO: 3.7 K/MM3 (ref 4.4–11)
WHITE BLOOD COUNT: 3.7 K/MM3 (ref 4.4–11)

## 2023-09-13 PROCEDURE — 36415 COLL VENOUS BLD VENIPUNCTURE: CPT

## 2023-09-13 PROCEDURE — 85027 COMPLETE CBC AUTOMATED: CPT

## 2023-09-14 ENCOUNTER — HOSPITAL ENCOUNTER (EMERGENCY)
Age: 63
Discharge: HOME | End: 2023-09-14
Payer: MEDICARE

## 2023-09-14 VITALS — BODY MASS INDEX: 24.9 KG/M2

## 2023-09-14 VITALS
TEMPERATURE: 98.42 F | DIASTOLIC BLOOD PRESSURE: 76 MMHG | HEART RATE: 80 BPM | RESPIRATION RATE: 16 BRPM | OXYGEN SATURATION: 99 % | SYSTOLIC BLOOD PRESSURE: 112 MMHG

## 2023-09-14 VITALS
OXYGEN SATURATION: 100 % | DIASTOLIC BLOOD PRESSURE: 70 MMHG | TEMPERATURE: 97.9 F | RESPIRATION RATE: 16 BRPM | HEART RATE: 73 BPM | SYSTOLIC BLOOD PRESSURE: 135 MMHG

## 2023-09-14 DIAGNOSIS — Z98.41: ICD-10-CM
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
## 2023-09-14 DIAGNOSIS — G44.309: Primary | ICD-10-CM

## 2023-09-14 DIAGNOSIS — M19.90: ICD-10-CM

## 2023-09-14 DIAGNOSIS — Z79.899: ICD-10-CM

## 2023-09-14 DIAGNOSIS — E78.00: ICD-10-CM

## 2023-09-14 DIAGNOSIS — M85.80: ICD-10-CM

## 2023-09-14 PROCEDURE — 70450 CT HEAD/BRAIN W/O DYE: CPT

## 2023-09-14 PROCEDURE — 99282 EMERGENCY DEPT VISIT SF MDM: CPT

## 2023-11-27 ENCOUNTER — HOSPITAL ENCOUNTER (OUTPATIENT)
Dept: HOSPITAL 100 - LAB | Age: 63
Discharge: HOME | End: 2023-11-27
Payer: MEDICARE

## 2023-11-27 DIAGNOSIS — J84.116: ICD-10-CM

## 2023-11-27 DIAGNOSIS — M81.0: ICD-10-CM

## 2023-11-27 DIAGNOSIS — Z13.29: ICD-10-CM

## 2023-11-27 DIAGNOSIS — E55.9: ICD-10-CM

## 2023-11-27 DIAGNOSIS — Z79.52: ICD-10-CM

## 2023-11-27 DIAGNOSIS — E21.3: Primary | ICD-10-CM

## 2023-11-27 LAB
ALANINE AMINOTRANSFER ALT/SGPT: 21 U/L (ref 13–56)
ALBUMIN SERPL-MCNC: 3.3 G/DL (ref 3.2–5)
ALKALINE PHOSPHATASE: 79 U/L (ref 45–117)
ANION GAP: 6 (ref 5–15)
AST(SGOT): 18 U/L (ref 15–37)
BUN SERPL-MCNC: 16 MG/DL (ref 7–18)
BUN/CREAT RATIO: 22.3 RATIO (ref 10–20)
CALCIUM 24H UR-MCNC: < 5 MG/DL
CALCIUM SERPL-MCNC: 8.2 MG/DL (ref 8.5–10.1)
CARBON DIOXIDE: 26 MMOL/L (ref 21–32)
CHLORIDE: 106 MMOL/L (ref 98–107)
EST GLOM FILT RATE - AFR AMER: 105 ML/MIN (ref 60–?)
GLOBULIN: 2.8 G/DL (ref 2.2–4.2)
GLUCOSE: 88 MG/DL (ref 74–106)
IONIZED CALCIUM ORDER: (no result)
MAGNESIUM: 2.4 MG/DL (ref 1.6–2.6)
POTASSIUM: 4.2 MMOL/L (ref 3.5–5.1)
PTHIN: 66.5 PG/ML (ref 18.4–80.1)
VITAMIN D,25 HYDROXY: 46.7 NG/ML

## 2023-11-27 PROCEDURE — 84100 ASSAY OF PHOSPHORUS: CPT

## 2023-11-27 PROCEDURE — 82306 VITAMIN D 25 HYDROXY: CPT

## 2023-11-27 PROCEDURE — 82652 VIT D 1 25-DIHYDROXY: CPT

## 2023-11-27 PROCEDURE — 84439 ASSAY OF FREE THYROXINE: CPT

## 2023-11-27 PROCEDURE — 83735 ASSAY OF MAGNESIUM: CPT

## 2023-11-27 PROCEDURE — 36415 COLL VENOUS BLD VENIPUNCTURE: CPT

## 2023-11-27 PROCEDURE — 80053 COMPREHEN METABOLIC PANEL: CPT

## 2023-11-27 PROCEDURE — 84443 ASSAY THYROID STIM HORMONE: CPT

## 2023-11-27 PROCEDURE — 82330 ASSAY OF CALCIUM: CPT

## 2023-11-27 PROCEDURE — 83970 ASSAY OF PARATHORMONE: CPT

## 2023-11-27 PROCEDURE — 82340 ASSAY OF CALCIUM IN URINE: CPT

## 2023-11-29 LAB — 1,25(OH)2D3 SERPL-MCNC: 57.8 PG/ML (ref 24.8–81.5)

## 2023-12-05 ENCOUNTER — HOSPITAL ENCOUNTER (OUTPATIENT)
Dept: HOSPITAL 100 - LAB | Age: 63
Discharge: HOME | End: 2023-12-05
Payer: MEDICARE

## 2023-12-05 DIAGNOSIS — Z79.899: Primary | ICD-10-CM

## 2023-12-05 LAB
ALANINE AMINOTRANSFER ALT/SGPT: 22 U/L (ref 13–56)
ALBUMIN SERPL-MCNC: 3.5 G/DL (ref 3.2–5)
ALKALINE PHOSPHATASE: 67 U/L (ref 45–117)
AST(SGOT): 25 U/L (ref 15–37)
BILIRUB DIRECT SERPL-MCNC: 0.09 MG/DL (ref 0–0.3)
DEPRECATED RDW RBC: 48.4 FL (ref 35.1–43.9)
ERYTHROCYTE [DISTWIDTH] IN BLOOD: 13.6 % (ref 11.6–14.6)
GLOBULIN: 2.8 G/DL (ref 2.2–4.2)
HCT VFR BLD AUTO: 43.6 % (ref 37–47)
HEMOGLOBIN: 13 G/DL (ref 12–15)
HGB BLD-MCNC: 13 G/DL (ref 12–15)
IMMATURE GRANULOCYTES COUNT: 0.03 X10^3/UL (ref 0–0)
MCV RBC: 96.2 FL (ref 81–99)
MEAN CORP HGB CONC: 29.8 G/DL (ref 32–36)
MEAN PLATELET VOL.: 10.1 FL (ref 6.2–12)
NRBC FLAGGED BY ANALYZER: 0 % (ref 0–5)
PLATELET # BLD: 160 K/MM3 (ref 150–450)
PLATELET COUNT: 160 K/MM3 (ref 150–450)
RBC # BLD AUTO: 4.53 M/MM3 (ref 4.2–5.4)
RBC DISTRIBUTION WIDTH CV: 13.6 % (ref 11.6–14.6)
RBC DISTRIBUTION WIDTH SD: 48.4 FL (ref 35.1–43.9)
WBC # BLD AUTO: 4.5 K/MM3 (ref 4.4–11)
WHITE BLOOD COUNT: 4.5 K/MM3 (ref 4.4–11)

## 2023-12-05 PROCEDURE — 36415 COLL VENOUS BLD VENIPUNCTURE: CPT

## 2023-12-05 PROCEDURE — 80076 HEPATIC FUNCTION PANEL: CPT

## 2023-12-05 PROCEDURE — 85025 COMPLETE CBC W/AUTO DIFF WBC: CPT

## 2023-12-18 ENCOUNTER — HOSPITAL ENCOUNTER (OUTPATIENT)
Dept: HOSPITAL 100 - EN | Age: 63
Discharge: HOME | End: 2023-12-18
Payer: MEDICARE

## 2023-12-18 VITALS
DIASTOLIC BLOOD PRESSURE: 67 MMHG | HEART RATE: 81 BPM | SYSTOLIC BLOOD PRESSURE: 108 MMHG | TEMPERATURE: 97.7 F | OXYGEN SATURATION: 98 % | RESPIRATION RATE: 18 BRPM

## 2023-12-18 VITALS
OXYGEN SATURATION: 98 % | SYSTOLIC BLOOD PRESSURE: 111 MMHG | RESPIRATION RATE: 16 BRPM | DIASTOLIC BLOOD PRESSURE: 56 MMHG | HEART RATE: 80 BPM

## 2023-12-18 VITALS
DIASTOLIC BLOOD PRESSURE: 60 MMHG | RESPIRATION RATE: 16 BRPM | HEART RATE: 71 BPM | TEMPERATURE: 97 F | OXYGEN SATURATION: 100 % | SYSTOLIC BLOOD PRESSURE: 120 MMHG

## 2023-12-18 VITALS
SYSTOLIC BLOOD PRESSURE: 113 MMHG | DIASTOLIC BLOOD PRESSURE: 52 MMHG | OXYGEN SATURATION: 100 % | HEART RATE: 78 BPM | RESPIRATION RATE: 16 BRPM

## 2023-12-18 VITALS
DIASTOLIC BLOOD PRESSURE: 80 MMHG | OXYGEN SATURATION: 97 % | SYSTOLIC BLOOD PRESSURE: 127 MMHG | TEMPERATURE: 97.52 F | HEART RATE: 76 BPM | RESPIRATION RATE: 16 BRPM

## 2023-12-18 VITALS — DIASTOLIC BLOOD PRESSURE: 80 MMHG | SYSTOLIC BLOOD PRESSURE: 127 MMHG

## 2023-12-18 VITALS — BODY MASS INDEX: 24.5 KG/M2

## 2023-12-18 DIAGNOSIS — N81.6: ICD-10-CM

## 2023-12-18 DIAGNOSIS — L93.2: ICD-10-CM

## 2023-12-18 DIAGNOSIS — K57.30: ICD-10-CM

## 2023-12-18 DIAGNOSIS — K44.9: ICD-10-CM

## 2023-12-18 DIAGNOSIS — M06.9: ICD-10-CM

## 2023-12-18 DIAGNOSIS — K21.00: ICD-10-CM

## 2023-12-18 DIAGNOSIS — Z12.11: Primary | ICD-10-CM

## 2023-12-18 DIAGNOSIS — K22.2: ICD-10-CM

## 2023-12-18 DIAGNOSIS — R13.10: ICD-10-CM

## 2023-12-18 DIAGNOSIS — Z79.899: ICD-10-CM

## 2023-12-18 PROCEDURE — 43239 EGD BIOPSY SINGLE/MULTIPLE: CPT

## 2023-12-18 PROCEDURE — 43248 EGD GUIDE WIRE INSERTION: CPT

## 2023-12-18 PROCEDURE — 88313 SPECIAL STAINS GROUP 2: CPT

## 2023-12-18 PROCEDURE — 0DJD8ZZ INSPECTION OF LOWER INTESTINAL TRACT, VIA NATURAL OR ARTIFICIAL OPENING ENDOSCOPIC: ICD-10-PCS | Performed by: INTERNAL MEDICINE

## 2023-12-18 PROCEDURE — 88305 TISSUE EXAM BY PATHOLOGIST: CPT

## 2024-01-10 ENCOUNTER — HOSPITAL ENCOUNTER (OUTPATIENT)
Dept: HOSPITAL 100 - OPBD | Age: 64
Discharge: HOME | End: 2024-01-10
Payer: MEDICARE

## 2024-01-10 DIAGNOSIS — M81.0: ICD-10-CM

## 2024-01-10 DIAGNOSIS — E21.3: Primary | ICD-10-CM

## 2024-01-10 PROCEDURE — 77080 DXA BONE DENSITY AXIAL: CPT

## 2024-03-08 ENCOUNTER — HOSPITAL ENCOUNTER (OUTPATIENT)
Dept: HOSPITAL 100 - LAB | Age: 64
Discharge: HOME | End: 2024-03-08
Payer: MEDICARE

## 2024-03-08 DIAGNOSIS — Z79.899: ICD-10-CM

## 2024-03-08 DIAGNOSIS — M06.4: ICD-10-CM

## 2024-03-08 DIAGNOSIS — D89.89: ICD-10-CM

## 2024-03-08 DIAGNOSIS — L40.8: Primary | ICD-10-CM

## 2024-03-08 LAB
ALANINE AMINOTRANSFER ALT/SGPT: 21 U/L (ref 13–56)
ALBUMIN SERPL-MCNC: 3.4 G/DL (ref 3.2–5)
ALKALINE PHOSPHATASE: 72 U/L (ref 45–117)
AST(SGOT): 25 U/L (ref 15–37)
BILIRUB DIRECT SERPL-MCNC: 0.09 MG/DL (ref 0–0.3)
DEPRECATED RDW RBC: 47 FL (ref 35.1–43.9)
ERYTHROCYTE [DISTWIDTH] IN BLOOD: 13.6 % (ref 11.6–14.6)
GLOBULIN: 2.6 G/DL (ref 2.2–4.2)
HCT VFR BLD AUTO: 40.4 % (ref 37–47)
HGB BLD-MCNC: 12.5 G/DL (ref 12–15)
IMMATURE GRANULOCYTES COUNT: 0.01 X10^3/UL (ref 0–0)
MCV RBC: 95.1 FL (ref 81–99)
MEAN CORP HGB CONC: 30.9 G/DL (ref 32–36)
MEAN PLATELET VOL.: 10.7 FL (ref 6.2–12)
NRBC FLAGGED BY ANALYZER: 0 % (ref 0–5)
PLATELET # BLD: 134 K/MM3 (ref 150–450)
RBC # BLD AUTO: 4.25 M/MM3 (ref 4.2–5.4)
WBC # BLD AUTO: 3.6 K/MM3 (ref 4.4–11)

## 2024-03-08 PROCEDURE — 86803 HEPATITIS C AB TEST: CPT

## 2024-03-08 PROCEDURE — 86706 HEP B SURFACE ANTIBODY: CPT

## 2024-03-08 PROCEDURE — 86704 HEP B CORE ANTIBODY TOTAL: CPT

## 2024-03-08 PROCEDURE — 80076 HEPATIC FUNCTION PANEL: CPT

## 2024-03-08 PROCEDURE — 86480 TB TEST CELL IMMUN MEASURE: CPT

## 2024-03-08 PROCEDURE — 87340 HEPATITIS B SURFACE AG IA: CPT

## 2024-03-08 PROCEDURE — 36415 COLL VENOUS BLD VENIPUNCTURE: CPT

## 2024-03-08 PROCEDURE — 85025 COMPLETE CBC W/AUTO DIFF WBC: CPT

## 2024-03-12 LAB
M TB TUBERC IFN-G BLD QL: 0.1 IU/ML
QNTFERON TB MITOGEN VALUE: > 10 IU/ML
QNTFERON TB NIL VALUE: 0.06 IU/ML
QNTFERON TB2+ AG VALUE: 0.16 IU/ML

## 2024-04-26 ENCOUNTER — HOSPITAL ENCOUNTER (OUTPATIENT)
Dept: HOSPITAL 100 - RAD.FUTURE | Age: 64
Discharge: HOME | End: 2024-04-26
Payer: MEDICARE

## 2024-04-26 DIAGNOSIS — J90: Primary | ICD-10-CM

## 2024-04-26 PROCEDURE — 71046 X-RAY EXAM CHEST 2 VIEWS: CPT

## 2024-05-02 ENCOUNTER — HOSPITAL ENCOUNTER (OUTPATIENT)
Dept: HOSPITAL 100 - OPBI | Age: 64
Discharge: HOME | End: 2024-05-02
Payer: MEDICARE

## 2024-05-02 DIAGNOSIS — Z12.31: Primary | ICD-10-CM

## 2024-05-02 DIAGNOSIS — E78.00: ICD-10-CM

## 2024-05-02 DIAGNOSIS — E53.8: ICD-10-CM

## 2024-05-02 DIAGNOSIS — Z80.3: ICD-10-CM

## 2024-05-02 DIAGNOSIS — Z13.220: ICD-10-CM

## 2024-05-02 DIAGNOSIS — J45.909: ICD-10-CM

## 2024-05-02 DIAGNOSIS — M19.90: ICD-10-CM

## 2024-05-02 DIAGNOSIS — E55.9: ICD-10-CM

## 2024-05-02 LAB
ALANINE AMINOTRANSFER ALT/SGPT: 23 U/L (ref 13–56)
ALBUMIN SERPL-MCNC: 3.2 G/DL (ref 3.2–5)
ALKALINE PHOSPHATASE: 88 U/L (ref 45–117)
ANION GAP: 5 (ref 5–15)
AST(SGOT): 24 U/L (ref 15–37)
BUN SERPL-MCNC: 21 MG/DL (ref 7–18)
BUN/CREAT RATIO: 28.3 RATIO (ref 10–20)
CALCIUM SERPL-MCNC: 8.3 MG/DL (ref 8.5–10.1)
CARBON DIOXIDE: 28 MMOL/L (ref 21–32)
CHLORIDE: 109 MMOL/L (ref 98–107)
CHOLEST SERPL-MCNC: 242 MG/DL
DEPRECATED RDW RBC: 46.6 FL (ref 35.1–43.9)
ERYTHROCYTE [DISTWIDTH] IN BLOOD: 13.6 % (ref 11.6–14.6)
EST GLOM FILT RATE - AFR AMER: 101 ML/MIN (ref 60–?)
FREE T3: 2.3 PG/ML (ref 2.18–3.98)
GLOBULIN: 2.6 G/DL (ref 2.2–4.2)
GLUCOSE: 97 MG/DL (ref 74–106)
HCT VFR BLD AUTO: 42 % (ref 37–47)
HGB BLD-MCNC: 13 G/DL (ref 12–15)
IMMATURE GRANULOCYTES COUNT: 0.03 X10^3/UL (ref 0–0)
MCV RBC: 93.5 FL (ref 81–99)
MEAN CORP HGB CONC: 31 G/DL (ref 32–36)
MEAN PLATELET VOL.: 10.7 FL (ref 6.2–12)
NRBC FLAGGED BY ANALYZER: 0 % (ref 0–5)
PLATELET # BLD: 131 K/MM3 (ref 150–450)
POTASSIUM: 4.1 MMOL/L (ref 3.5–5.1)
RBC # BLD AUTO: 4.49 M/MM3 (ref 4.2–5.4)
TRIGLYCERIDES: 151 MG/DL
VITAMIN B12: 534 PG/ML (ref 211–911)
VITAMIN D,25 HYDROXY: 87.3 NG/ML
VLDLC SERPL-MCNC: 30 MG/DL (ref 5–40)
WBC # BLD AUTO: 4.8 K/MM3 (ref 4.4–11)

## 2024-05-02 PROCEDURE — 82607 VITAMIN B-12: CPT

## 2024-05-02 PROCEDURE — 77067 SCR MAMMO BI INCL CAD: CPT

## 2024-05-02 PROCEDURE — 84439 ASSAY OF FREE THYROXINE: CPT

## 2024-05-02 PROCEDURE — 84443 ASSAY THYROID STIM HORMONE: CPT

## 2024-05-02 PROCEDURE — 85025 COMPLETE CBC W/AUTO DIFF WBC: CPT

## 2024-05-02 PROCEDURE — 36415 COLL VENOUS BLD VENIPUNCTURE: CPT

## 2024-05-02 PROCEDURE — 80053 COMPREHEN METABOLIC PANEL: CPT

## 2024-05-02 PROCEDURE — 84481 FREE ASSAY (FT-3): CPT

## 2024-05-02 PROCEDURE — 82306 VITAMIN D 25 HYDROXY: CPT

## 2024-05-02 PROCEDURE — 80061 LIPID PANEL: CPT

## 2024-05-02 PROCEDURE — 77063 BREAST TOMOSYNTHESIS BI: CPT

## 2024-05-24 ENCOUNTER — SPECIALTY PHARMACY (OUTPATIENT)
Dept: PHARMACY | Facility: CLINIC | Age: 64
End: 2024-05-24

## 2024-06-03 ENCOUNTER — HOSPITAL ENCOUNTER (OUTPATIENT)
Dept: HOSPITAL 100 - LAB | Age: 64
Discharge: HOME | End: 2024-06-03
Payer: MEDICARE

## 2024-06-03 DIAGNOSIS — E21.3: Primary | ICD-10-CM

## 2024-06-03 DIAGNOSIS — J84.116: ICD-10-CM

## 2024-06-03 DIAGNOSIS — M81.0: ICD-10-CM

## 2024-06-03 DIAGNOSIS — E55.9: ICD-10-CM

## 2024-06-03 PROCEDURE — 86376 MICROSOMAL ANTIBODY EACH: CPT

## 2024-06-03 PROCEDURE — 36415 COLL VENOUS BLD VENIPUNCTURE: CPT

## 2024-06-12 ENCOUNTER — HOSPITAL ENCOUNTER (OUTPATIENT)
Dept: HOSPITAL 100 - LAB | Age: 64
Discharge: HOME | End: 2024-06-12
Payer: MEDICARE

## 2024-06-12 DIAGNOSIS — R06.02: Primary | ICD-10-CM

## 2024-06-12 LAB
DEPRECATED RDW RBC: 47.4 FL (ref 35.1–43.9)
ERYTHROCYTE [DISTWIDTH] IN BLOOD: 13.6 % (ref 11.6–14.6)
HCT VFR BLD AUTO: 41.1 % (ref 37–47)
HGB BLD-MCNC: 12.7 G/DL (ref 12–15)
MCV RBC: 94.9 FL (ref 81–99)
MEAN CORP HGB CONC: 30.9 G/DL (ref 32–36)
MEAN PLATELET VOL.: 10.1 FL (ref 6.2–12)
PLATELET # BLD: 139 K/MM3 (ref 150–450)
RBC # BLD AUTO: 4.33 M/MM3 (ref 4.2–5.4)
WBC # BLD AUTO: 4.2 K/MM3 (ref 4.4–11)

## 2024-06-12 PROCEDURE — 85027 COMPLETE CBC AUTOMATED: CPT

## 2024-06-12 PROCEDURE — 36415 COLL VENOUS BLD VENIPUNCTURE: CPT

## 2024-06-19 PROCEDURE — RXMED WILLOW AMBULATORY MEDICATION CHARGE

## 2024-06-27 ENCOUNTER — SPECIALTY PHARMACY (OUTPATIENT)
Dept: PHARMACY | Facility: CLINIC | Age: 64
End: 2024-06-27

## 2024-07-05 ENCOUNTER — HOSPITAL ENCOUNTER (OUTPATIENT)
Dept: HOSPITAL 100 - CT | Age: 64
Discharge: HOME | End: 2024-07-05
Payer: MEDICARE

## 2024-07-05 DIAGNOSIS — J32.8: Primary | ICD-10-CM

## 2024-07-05 PROCEDURE — 70488 CT MAXILLOFACIAL W/O & W/DYE: CPT

## 2024-07-05 PROCEDURE — 70486 CT MAXILLOFACIAL W/O DYE: CPT

## 2024-07-10 ENCOUNTER — PHARMACY VISIT (OUTPATIENT)
Dept: PHARMACY | Facility: CLINIC | Age: 64
End: 2024-07-10
Payer: COMMERCIAL

## 2024-07-12 ENCOUNTER — SPECIALTY PHARMACY (OUTPATIENT)
Dept: PHARMACY | Facility: CLINIC | Age: 64
End: 2024-07-12

## 2024-08-01 ENCOUNTER — HOSPITAL ENCOUNTER (OUTPATIENT)
Age: 64
Discharge: HOME | End: 2024-08-01
Payer: MEDICARE

## 2024-08-01 DIAGNOSIS — D89.89: ICD-10-CM

## 2024-08-01 DIAGNOSIS — J84.116: Primary | ICD-10-CM

## 2024-08-01 DIAGNOSIS — L40.8: ICD-10-CM

## 2024-08-01 DIAGNOSIS — M06.4: ICD-10-CM

## 2024-08-01 DIAGNOSIS — Z79.899: ICD-10-CM

## 2024-08-01 DIAGNOSIS — L30.9: ICD-10-CM

## 2024-08-01 LAB
ALANINE AMINOTRANSFER ALT/SGPT: 16 U/L (ref 13–56)
ALBUMIN SERPL-MCNC: 3.5 G/DL (ref 3.2–5)
ALKALINE PHOSPHATASE: 86 U/L (ref 45–117)
AST(SGOT): 24 U/L (ref 15–37)
BILIRUB DIRECT SERPL-MCNC: 0.08 MG/DL (ref 0–0.3)
DEPRECATED RDW RBC: 45.1 FL (ref 35.1–43.9)
ERYTHROCYTE [DISTWIDTH] IN BLOOD: 13.3 % (ref 11.6–14.6)
GLOBULIN: 2.6 G/DL (ref 2.2–4.2)
HCT VFR BLD AUTO: 41.5 % (ref 37–47)
HEMOGLOBIN: 12.9 G/DL (ref 12–15)
HGB BLD-MCNC: 12.9 G/DL (ref 12–15)
IMMATURE GRANULOCYTES COUNT: 0.01 X10^3/UL (ref 0–0)
MCV RBC: 92.8 FL (ref 81–99)
MEAN CORP HGB CONC: 31.1 G/DL (ref 32–36)
MEAN PLATELET VOL.: 10.9 FL (ref 6.2–12)
NRBC FLAGGED BY ANALYZER: 0 % (ref 0–5)
PLATELET # BLD: 148 K/MM3 (ref 150–450)
PLATELET COUNT: 148 K/MM3 (ref 150–450)
RBC # BLD AUTO: 4.47 M/MM3 (ref 4.2–5.4)
RBC DISTRIBUTION WIDTH CV: 13.3 % (ref 11.6–14.6)
RBC DISTRIBUTION WIDTH SD: 45.1 FL (ref 35.1–43.9)
WBC # BLD AUTO: 4.1 K/MM3 (ref 4.4–11)
WHITE BLOOD COUNT: 4.1 K/MM3 (ref 4.4–11)

## 2024-08-01 PROCEDURE — 36415 COLL VENOUS BLD VENIPUNCTURE: CPT

## 2024-08-01 PROCEDURE — 85025 COMPLETE CBC W/AUTO DIFF WBC: CPT

## 2024-08-01 PROCEDURE — 80076 HEPATIC FUNCTION PANEL: CPT

## 2024-08-08 ENCOUNTER — HOSPITAL ENCOUNTER (OUTPATIENT)
Dept: HOSPITAL 100 - OPUS | Age: 64
Discharge: HOME | End: 2024-08-08
Payer: MEDICARE

## 2024-08-08 DIAGNOSIS — N64.4: Primary | ICD-10-CM

## 2024-08-08 PROCEDURE — 76641 ULTRASOUND BREAST COMPLETE: CPT

## 2024-10-01 ENCOUNTER — SPECIALTY PHARMACY (OUTPATIENT)
Dept: PHARMACY | Facility: CLINIC | Age: 64
End: 2024-10-01

## 2024-10-01 PROCEDURE — RXMED WILLOW AMBULATORY MEDICATION CHARGE

## 2024-10-04 ENCOUNTER — SPECIALTY PHARMACY (OUTPATIENT)
Dept: PHARMACY | Facility: CLINIC | Age: 64
End: 2024-10-04

## 2024-10-07 ENCOUNTER — HOSPITAL ENCOUNTER (OUTPATIENT)
Age: 64
Discharge: HOME | End: 2024-10-07
Payer: MEDICARE

## 2024-10-07 DIAGNOSIS — M54.12: Primary | ICD-10-CM

## 2024-10-07 PROCEDURE — 72141 MRI NECK SPINE W/O DYE: CPT

## 2024-10-11 ENCOUNTER — PHARMACY VISIT (OUTPATIENT)
Dept: PHARMACY | Facility: CLINIC | Age: 64
End: 2024-10-11
Payer: COMMERCIAL

## 2024-10-14 ENCOUNTER — HOSPITAL ENCOUNTER (OUTPATIENT)
Dept: HOSPITAL 100 - LAB | Age: 64
Discharge: HOME | End: 2024-10-14
Payer: MEDICARE

## 2024-10-14 DIAGNOSIS — J84.116: Primary | ICD-10-CM

## 2024-10-14 LAB
DEPRECATED RDW RBC: 48.2 FL (ref 35.1–43.9)
ERYTHROCYTE [DISTWIDTH] IN BLOOD: 14 % (ref 11.6–14.6)
HCT VFR BLD AUTO: 39.7 % (ref 37–47)
HEMOGLOBIN: 12.5 G/DL (ref 12–15)
HGB BLD-MCNC: 12.5 G/DL (ref 12–15)
MCV RBC: 93.6 FL (ref 81–99)
MEAN CORP HGB CONC: 31.5 G/DL (ref 32–36)
MEAN PLATELET VOL.: 10.2 FL (ref 6.2–12)
PLATELET # BLD: 145 K/MM3 (ref 150–450)
PLATELET COUNT: 145 K/MM3 (ref 150–450)
RBC # BLD AUTO: 4.24 M/MM3 (ref 4.2–5.4)
RBC DISTRIBUTION WIDTH CV: 14 % (ref 11.6–14.6)
RBC DISTRIBUTION WIDTH SD: 48.2 FL (ref 35.1–43.9)
WBC # BLD AUTO: 4 K/MM3 (ref 4.4–11)
WHITE BLOOD COUNT: 4 K/MM3 (ref 4.4–11)

## 2024-10-14 PROCEDURE — 36415 COLL VENOUS BLD VENIPUNCTURE: CPT

## 2024-10-14 PROCEDURE — 85027 COMPLETE CBC AUTOMATED: CPT

## 2024-12-02 ENCOUNTER — HOSPITAL ENCOUNTER (OUTPATIENT)
Dept: HOSPITAL 100 - LAB | Age: 64
Discharge: HOME | End: 2024-12-02
Payer: MEDICARE

## 2024-12-02 DIAGNOSIS — M81.0: ICD-10-CM

## 2024-12-02 DIAGNOSIS — J84.116: ICD-10-CM

## 2024-12-02 DIAGNOSIS — E78.5: ICD-10-CM

## 2024-12-02 DIAGNOSIS — E55.9: ICD-10-CM

## 2024-12-02 DIAGNOSIS — E21.3: Primary | ICD-10-CM

## 2024-12-02 LAB
ALANINE AMINOTRANSFER ALT/SGPT: 20 U/L (ref 13–56)
ALBUMIN SERPL-MCNC: 3.4 G/DL (ref 3.2–5)
ALKALINE PHOSPHATASE: 64 U/L (ref 45–117)
ANION GAP: 2 (ref 5–15)
AST(SGOT): 23 U/L (ref 15–37)
BUN SERPL-MCNC: 14 MG/DL (ref 7–18)
BUN/CREAT RATIO: 18.7 RATIO (ref 10–20)
CALCIUM SERPL-MCNC: 8.6 MG/DL (ref 8.5–10.1)
CARBON DIOXIDE: 29 MMOL/L (ref 21–32)
CHLORIDE: 109 MMOL/L (ref 98–107)
CHOLEST SERPL-MCNC: 204 MG/DL
EST GLOM FILT RATE - AFR AMER: 100 ML/MIN (ref 60–?)
FREE T3: 2.3 PG/ML (ref 2.18–3.98)
GLOBULIN: 2.6 G/DL (ref 2.2–4.2)
GLUCOSE: 89 MG/DL (ref 74–106)
POTASSIUM: 4.5 MMOL/L (ref 3.5–5.1)
PTHIN: 106.6 PG/ML (ref 18.4–80.1)
TRIGLYCERIDES: 115 MG/DL
VITAMIN D,25 HYDROXY: 83.7 NG/ML
VLDLC SERPL-MCNC: 23 MG/DL (ref 5–40)

## 2024-12-02 PROCEDURE — 83970 ASSAY OF PARATHORMONE: CPT

## 2024-12-02 PROCEDURE — 82306 VITAMIN D 25 HYDROXY: CPT

## 2024-12-02 PROCEDURE — 36415 COLL VENOUS BLD VENIPUNCTURE: CPT

## 2024-12-02 PROCEDURE — 80053 COMPREHEN METABOLIC PANEL: CPT

## 2024-12-02 PROCEDURE — 83036 HEMOGLOBIN GLYCOSYLATED A1C: CPT

## 2024-12-02 PROCEDURE — 84443 ASSAY THYROID STIM HORMONE: CPT

## 2024-12-02 PROCEDURE — 80061 LIPID PANEL: CPT

## 2024-12-02 PROCEDURE — 84439 ASSAY OF FREE THYROXINE: CPT

## 2024-12-02 PROCEDURE — 84481 FREE ASSAY (FT-3): CPT

## 2024-12-09 ENCOUNTER — HOSPITAL ENCOUNTER (OUTPATIENT)
Dept: HOSPITAL 100 - MTRAD | Age: 64
Discharge: HOME | End: 2024-12-09
Payer: MEDICARE

## 2024-12-09 DIAGNOSIS — R07.9: Primary | ICD-10-CM

## 2024-12-09 PROCEDURE — 71046 X-RAY EXAM CHEST 2 VIEWS: CPT

## 2024-12-26 ENCOUNTER — HOSPITAL ENCOUNTER (OUTPATIENT)
Dept: HOSPITAL 100 - RAD | Age: 64
Discharge: HOME | End: 2024-12-26
Payer: MEDICARE
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
## 2024-12-26 DIAGNOSIS — S20.212A: Primary | ICD-10-CM

## 2024-12-26 PROCEDURE — 71100 X-RAY EXAM RIBS UNI 2 VIEWS: CPT

## 2025-01-03 ENCOUNTER — HOSPITAL ENCOUNTER (OUTPATIENT)
Dept: HOSPITAL 100 - US | Age: 65
Discharge: HOME | End: 2025-01-03
Payer: MEDICARE

## 2025-01-03 DIAGNOSIS — R10.9: Primary | ICD-10-CM

## 2025-01-03 PROCEDURE — 76705 ECHO EXAM OF ABDOMEN: CPT

## 2025-01-06 PROCEDURE — RXMED WILLOW AMBULATORY MEDICATION CHARGE

## 2025-01-20 ENCOUNTER — SPECIALTY PHARMACY (OUTPATIENT)
Dept: PHARMACY | Facility: CLINIC | Age: 65
End: 2025-01-20

## 2025-01-21 ENCOUNTER — PHARMACY VISIT (OUTPATIENT)
Dept: PHARMACY | Facility: CLINIC | Age: 65
End: 2025-01-21
Payer: COMMERCIAL

## 2025-02-03 ENCOUNTER — SPECIALTY PHARMACY (OUTPATIENT)
Dept: PHARMACY | Facility: CLINIC | Age: 65
End: 2025-02-03

## 2025-02-03 RX ORDER — CLOBETASOL PROPIONATE 0.05 G/100ML
SHAMPOO TOPICAL
COMMUNITY

## 2025-02-03 RX ORDER — BETAMETHASONE DIPROPIONATE 0.5 MG/G
CREAM TOPICAL
COMMUNITY
Start: 2024-02-05

## 2025-02-03 NOTE — PROGRESS NOTES
"[facility] Clinical Note  Patient Reassessment     Introduction  Mel Parsons is a 64 y.o. female who is on the specialty pharmacy service for management of: Dermatology Core.      [facility] supplied medication: risankizumab-rzaa (Skyrizi) 150 mg/mL pen injector pen  Inject 150mg under the skin every 12 weeks     Duration of therapy: Maintenance    The most recent encounter visit with the referring prescriber WESTON CASTILLO  on 06/07/23 was reviewed.  Pharmacy will continue to collaborate in the care of this patient with the referring prescriber.    Discussion  Mel was contacted on 2/3/2025 at 9:50 AM for a pharmacy visit with encounter number [id number] from:   [facility]  [address] [place] [id number]  Dept: [phone]  Dept Fax: [phone]  Mel consented to a/an Telephone visit, which was performed.    Efficacy  Patient has developed new symptoms of condition: No  Patient/caregiver feels medication is affecting the disease state: Patient states 90% improvement in symptoms since starting the Skyrizi. She notes she recently had an eczema flare in which she is using topical agents daily now for 2 weeks. But notes much improvement with scalp psoriasis.    Goals  Provided education on goals and possible outcomes of therapy:  Adherence with therapy  Timely completion of appropriate labs  Timely and appropriate follow up with provider  Identify and address medication interactions with presciption medications, OTC medications and supplements  Optimize or maintain quality of life  Dermatology: Prevent or reduce disease flares  Reduce use of topical agents (corticosteroids or other \"prn\" agents)  Reduction of plaque size, thickness and body surface area (PSO)  Patient has documented target(s) for goals of therapy: Yes  Patient status for goal(s): On track    Tolerance  Patient has experienced side effects from this medication: Yes - Patient notes " she got COVID on 2 occasions after injecting the medication  Changes to current therapy regimen: No    The follow-up timeline was discussed. Every person responds to and reacts to therapy differently. Patient should be assessed for efficacy and tolerability in approximately: 6 months    Adherence  Patient Information  Informant: Self (Patient)  Demonstrates Understanding of Importance of Adherence: Yes  Does the patient have any barriers to self-administration (including physical and mental?): No  Support Network for Adherence: Healthcare Provider  Medication Information  Medication: risankizumab-rzaa (Skyrizi)  Patient Reported Missed Doses in the Last 4 Weeks: 0 (Reports a delayed dose last year due to illness)  Estimated Medication Adherence Level: Good  Adherence Estimation Source: Claims history  Barriers to Adherence: No Problems identified   The importance of adherence was discussed and patient/caregiver was advised to take the medication as prescribed by their provider. Encouraged patient/caregiver to call physician's office or specialty pharmacy if they have a question regarding a missed dose.    General Assessment  Changes to home medications, OTCs or supplements: No  Current Outpatient Medications   Medication Sig Dispense Refill    betamethasone, augmented, (Diprolene AF) 0.05 % cream       clobetasoL 0.05 % shampoo WASH SCALP ONCE daily let sit FOR 1-2 minutes BEFORE rinsing      risankizumab-rzaa (Skyrizi) 150 mg/mL pen injector pen Inject 1 mL (150 mg) under the skin every 12 weeks. 1 mL 6    risankizumab-rzaa (Skyrizi) 150 mg/mL pen injector pen Inject 150mg C every 12 weeks 1 mL 6     No current facility-administered medications for this visit.     Reported new allergies: No  Reported new medical conditions: No  Additional monitoring reviewed: N/A  Is laboratory follow up needed? No    Advised to contact the pharmacy if there are any changes to the patient's medication list, including prescriptions,  OTC medications, herbal products, or supplements.    Impression/Plan  This patient has been identified as high risk due to Geriatric (over 65 years of age).  The following action was taken: N/A.    QOL/Patient Satisfaction  Rate your quality of life on scale of 1-10: 7  Rate your satisfaction with  Specialty Pharmacy on scale of 1-10: 10 - Completely satisfied    Provided contact information ([phone]) for [facility] and reviewed dispensing process, refill timeline and patient management follow up. Confirmed understanding of education conducted during assessment. All questions and concerns were addressed and patient/caregiver was encouraged to reach out for additional questions or concerns.    Based on the patient's diagnosis, medication list, progress towards goals, adherence, tolerance, and medication list, medication remains appropriate: Therapy remains appropriate (I attest)    Nimisha Campoverde, ChandanaD

## 2025-02-18 ENCOUNTER — HOSPITAL ENCOUNTER (OUTPATIENT)
Dept: HOSPITAL 100 - LAB | Age: 65
Discharge: HOME | End: 2025-02-18
Payer: MEDICARE

## 2025-02-18 DIAGNOSIS — Z79.899: Primary | ICD-10-CM

## 2025-02-18 LAB
ALANINE AMINOTRANSFER ALT/SGPT: 22 U/L (ref 13–56)
ALBUMIN SERPL-MCNC: 3.3 G/DL (ref 3.2–5)
ALKALINE PHOSPHATASE: 76 U/L (ref 45–117)
AST(SGOT): 28 U/L (ref 15–37)
BILIRUB DIRECT SERPL-MCNC: 0.05 MG/DL (ref 0–0.3)
DEPRECATED RDW RBC: 42.7 FL (ref 35.1–43.9)
ERYTHROCYTE [DISTWIDTH] IN BLOOD: 12.9 % (ref 11.6–14.6)
GLOBULIN: 2.9 G/DL (ref 2.2–4.2)
HCT VFR BLD AUTO: 39.6 % (ref 37–47)
HEMOGLOBIN: 12.4 G/DL (ref 12–15)
HGB BLD-MCNC: 12.4 G/DL (ref 12–15)
IMMATURE GRANULOCYTES COUNT: 0.01 X10^3/UL (ref 0–0)
MCV RBC: 91 FL (ref 81–99)
MEAN CORP HGB CONC: 31.3 G/DL (ref 32–36)
MEAN PLATELET VOL.: 10.2 FL (ref 6.2–12)
NRBC FLAGGED BY ANALYZER: 0 % (ref 0–5)
PLATELET # BLD: 168 K/MM3 (ref 150–450)
PLATELET COUNT: 168 K/MM3 (ref 150–450)
RBC # BLD AUTO: 4.35 M/MM3 (ref 4.2–5.4)
RBC DISTRIBUTION WIDTH CV: 12.9 % (ref 11.6–14.6)
RBC DISTRIBUTION WIDTH SD: 42.7 FL (ref 35.1–43.9)
WBC # BLD AUTO: 4.5 K/MM3 (ref 4.4–11)
WHITE BLOOD COUNT: 4.5 K/MM3 (ref 4.4–11)

## 2025-02-18 PROCEDURE — 36415 COLL VENOUS BLD VENIPUNCTURE: CPT

## 2025-02-18 PROCEDURE — 80076 HEPATIC FUNCTION PANEL: CPT

## 2025-02-18 PROCEDURE — 85025 COMPLETE CBC W/AUTO DIFF WBC: CPT

## 2025-04-16 ENCOUNTER — HOSPITAL ENCOUNTER (OUTPATIENT)
Dept: HOSPITAL 100 - LAB | Age: 65
Discharge: HOME | End: 2025-04-16
Payer: MEDICARE

## 2025-04-16 DIAGNOSIS — D89.89: ICD-10-CM

## 2025-04-16 DIAGNOSIS — M06.4: ICD-10-CM

## 2025-04-16 DIAGNOSIS — Z79.899: ICD-10-CM

## 2025-04-16 DIAGNOSIS — L30.9: ICD-10-CM

## 2025-04-16 DIAGNOSIS — L40.8: Primary | ICD-10-CM

## 2025-04-16 PROCEDURE — 36415 COLL VENOUS BLD VENIPUNCTURE: CPT

## 2025-04-16 PROCEDURE — 86480 TB TEST CELL IMMUN MEASURE: CPT

## 2025-04-18 ENCOUNTER — PHARMACY VISIT (OUTPATIENT)
Dept: PHARMACY | Facility: CLINIC | Age: 65
End: 2025-04-18
Payer: COMMERCIAL

## 2025-04-18 ENCOUNTER — SPECIALTY PHARMACY (OUTPATIENT)
Dept: PHARMACY | Facility: CLINIC | Age: 65
End: 2025-04-18

## 2025-04-18 PROCEDURE — RXMED WILLOW AMBULATORY MEDICATION CHARGE

## 2025-05-02 ENCOUNTER — HOSPITAL ENCOUNTER (OUTPATIENT)
Dept: HOSPITAL 100 - LAB | Age: 65
Discharge: HOME | End: 2025-05-02
Payer: MEDICARE

## 2025-05-02 DIAGNOSIS — R23.2: Primary | ICD-10-CM

## 2025-05-02 DIAGNOSIS — E55.9: ICD-10-CM

## 2025-05-02 DIAGNOSIS — E53.8: ICD-10-CM

## 2025-05-02 DIAGNOSIS — M19.90: ICD-10-CM

## 2025-05-02 DIAGNOSIS — E78.00: ICD-10-CM

## 2025-05-02 DIAGNOSIS — Z13.220: ICD-10-CM

## 2025-05-02 LAB
ALP SERPL-CCNC: 54 U/L (ref 35–104)
ALT SERPL-CCNC: 13 U/L (ref ?–34)
ANION GAP SERPL CALC-SCNC: 9 MMOL/L (ref 5–15)
AST(SGOT): 25 U/L (ref ?–31)
BUN SERPL-MCNC: 16 MG/DL (ref 4–19)
BUN/CREAT SERPL: 18.4 RATIO (ref 10–20)
CHLORIDE: 107 MMOL/L (ref 98–108)
CHOLEST SERPL-MCNC: 227 MG/DL (ref ?–200)
CHOLESTEROL:HDL RATIO SCREEN: 3.85
DEPRECATED RDW RBC: 47.3 FL (ref 35.1–43.9)
ERYTHROCYTE [DISTWIDTH] IN BLOOD: 13.8 % (ref 11.6–14.6)
FREE T3: 2.5 PG/ML (ref 2.18–3.98)
GLUCOSE SERPL-MCNC: 82 MG/DL (ref 70–99)
HCT VFR BLD AUTO: 40.6 % (ref 37–47)
LOW DENSITY LIPOPROTEIN CALC.: 147 MG/DL
MAGNESIUM SPEC-MCNC: 2.4 MG/DL (ref 1.5–2.2)
MCV RBC: 92.5 FL (ref 81–99)
MEAN PLATELET VOL.: 10.5 FL (ref 6.2–12)
NRBC FLAGGED BY ANALYZER: 0 % (ref 0–5)
PLATELET # BLD: 141 K/MM3 (ref 150–450)
POTASSIUM SPEC-MCNC: 4.4 MMOL/L (ref 3.3–5.1)
RBC # BLD AUTO: 4.39 M/MM3 (ref 4.2–5.4)
TRIGLYCERIDES: 104 MG/DL
VIT B12 SERPL-MCNC: 348 PG/ML (ref 180–914)
VITAMIN D,25 HYDROXY: 43.6 NG/ML (ref 30–100)
VLDLC SERPL-MCNC: 21 MG/DL (ref 5–40)
WBC # BLD AUTO: 3.3 K/MM3 (ref 4.4–11)

## 2025-05-02 PROCEDURE — 80061 LIPID PANEL: CPT

## 2025-05-02 PROCEDURE — 82607 VITAMIN B-12: CPT

## 2025-05-02 PROCEDURE — 36415 COLL VENOUS BLD VENIPUNCTURE: CPT

## 2025-05-02 PROCEDURE — 80053 COMPREHEN METABOLIC PANEL: CPT

## 2025-05-02 PROCEDURE — 85025 COMPLETE CBC W/AUTO DIFF WBC: CPT

## 2025-05-02 PROCEDURE — 83735 ASSAY OF MAGNESIUM: CPT

## 2025-05-02 PROCEDURE — 84443 ASSAY THYROID STIM HORMONE: CPT

## 2025-05-02 PROCEDURE — 84439 ASSAY OF FREE THYROXINE: CPT

## 2025-05-02 PROCEDURE — 82306 VITAMIN D 25 HYDROXY: CPT

## 2025-05-02 PROCEDURE — 84481 FREE ASSAY (FT-3): CPT

## 2025-05-12 ENCOUNTER — HOSPITAL ENCOUNTER (OUTPATIENT)
Dept: HOSPITAL 100 - OPBI | Age: 65
Discharge: HOME | End: 2025-05-12
Payer: MEDICARE

## 2025-05-12 DIAGNOSIS — Z12.31: Primary | ICD-10-CM

## 2025-05-12 PROCEDURE — 77067 SCR MAMMO BI INCL CAD: CPT

## 2025-05-12 PROCEDURE — 77063 BREAST TOMOSYNTHESIS BI: CPT

## 2025-07-14 ENCOUNTER — SPECIALTY PHARMACY (OUTPATIENT)
Dept: PHARMACY | Facility: CLINIC | Age: 65
End: 2025-07-14

## 2025-07-14 PROCEDURE — RXMED WILLOW AMBULATORY MEDICATION CHARGE

## 2025-07-16 ENCOUNTER — PHARMACY VISIT (OUTPATIENT)
Dept: PHARMACY | Facility: CLINIC | Age: 65
End: 2025-07-16
Payer: COMMERCIAL

## 2025-08-04 ENCOUNTER — SPECIALTY PHARMACY (OUTPATIENT)
Dept: PHARMACY | Facility: CLINIC | Age: 65
End: 2025-08-04

## 2025-08-04 NOTE — PROGRESS NOTES
"[facility] Clinical Note  Patient Reassessment     Introduction  Mel Parsons is a 64 y.o. female who is on the specialty pharmacy service for management of: Dermatology Core.      [facility] supplied medication: risankizumab-rzaa (Skyrizi) 150 mg/mL pen injector pen  Inject 1 mL (150 mg) under the skin every 12 weeks.        Duration of therapy: Maintenance    The most recent encounter visit with the referring prescriber WESTON CASTILLO  on 02/25/25 was reviewed.  Pharmacy will continue to collaborate in the care of this patient with the referring prescriber.    Discussion  Mel was contacted on 8/4/2025 at 10:08 AM for a pharmacy visit with encounter number [id number] from:   [facility]  [address] [place] [id number]  Dept: [phone]  Dept Fax: [phone]  Mel consented to a/an Telephone visit, which was performed.    Efficacy  Patient has developed new symptoms of condition: No  Patient/caregiver feels medication is affecting the disease state: Patient reports that the Skyrizi has improved her scalp psoriasis. She notes that there skin is 90% clear and denies any recent flares on her scalp. She notes using clobetasol shampoo twice weekly but states that her overall use of this topical agent has reduced with Skyrizi.    Goals  Provided education on goals and possible outcomes of therapy:  Adherence with therapy  Timely completion of appropriate labs  Timely and appropriate follow up with provider  Identify and address medication interactions with presciption medications, OTC medications and supplements  Optimize or maintain quality of life  Dermatology: Prevent or reduce disease flares  Reduce use of topical agents (corticosteroids or other \"prn\" agents)  Reduction of plaque size, thickness and body surface area (PSO)  Patient has documented target(s) for goals of therapy: Yes    Targets       Target Due Progress Assessment Last Assessed " "Completed Completed By Outcome Source     Goal: Prevent and reduce disease flares 12/4/2025 -- -- -- -- -- --     Goal: Reduce use of ancillary or \"prn\" medications 12/4/2025 -- -- -- -- -- --     Goal: Prevent and reduce disease flares 6/3/2025 On Track (Improving) 8/4/2025 8/4/2025 Nimisha Campoverde PharmD On Track --    Skin 90% clear. No recent flares on her scalp psoriasis.       Goal: Reduce use of ancillary or \"prn\" medications 6/3/2025 On Track (Unchanging) 8/4/2025 8/4/2025 Nimisha Campoverde PharmD On Track --    Patient reports using clobetasol shampoo twice weekly. Says overall use of topical agent has reduced with Skyrizi.       Goal: Prevent and reduce disease flares 8/4/2025 -- -- 2/3/2025 Nimisha Campoverde PharmD On Track --    Patient reports 90% improvement in symptoms since starting Skyrizi. Recently had eczema flares but states med has helped with scalp psoriasis.       Goal: Reduce use of ancillary or \"prn\" medications 8/4/2025 -- -- 2/3/2025 Nimisha Campoverde PharmD On Track --    Using topical agents daily for 2 weeks now since had a recent flare.               Tolerance  Patient has experienced side effects from this medication: No  Changes to current therapy regimen: No    The follow-up timeline was discussed. Every person responds to and reacts to therapy differently. Patient should be assessed for efficacy and tolerability in approximately: 6 months            Adherence  Patient Information  Informant: Self (Patient)  Demonstrates Understanding of Importance of Adherence: Yes  Does the patient have any barriers to self-administration (including physical and mental?): No  Medication Information  Medication: risankizumab-rzaa (Skyrizi)  Patient Reported Missed Doses in the Last 4 Weeks: 0  Estimated Medication Adherence Level: Good  Adherence Estimation Source: Claims history  Barriers to Adherence: No Problems identified   The importance of adherence was discussed and patient/caregiver was advised " "to take the medication as prescribed by their provider. Encouraged patient/caregiver to call physician's office or specialty pharmacy if they have a question regarding a missed dose.    General Assessment  Changes to home medications, OTCs or supplements: Yes - stopped Cellcept - not on med list  Current Medications[1]  Reported new allergies: No  Reported new medical conditions: No  Additional monitoring reviewed: Dermatology- For Biologics- TB: No results found for: \"TBSIN\", \"TBGRES\", \"QFG\", \"TSPOTR\"  Is laboratory follow up needed? No - per MD discretion since outside provider    Advised to contact the pharmacy if there are any changes to the patient's medication list, including prescriptions, OTC medications, herbal products, or supplements.    Impression/Plan  This patient has not been identified as high risk due to Lack of high risk qualifiers.  The following action was taken:N/A          QOL/Patient Satisfaction  Rate your quality of life on scale of 1-10: 7  Rate your satisfaction with  Specialty Pharmacy on scale of 1-10: 10 - Completely satisfied    Provided contact information ([phone]) for [facility] and reviewed dispensing process, refill timeline and patient management follow up. Confirmed understanding of education conducted during assessment. All questions and concerns were addressed and patient/caregiver was encouraged to reach out for additional questions or concerns.    Based on the patient's diagnosis, medication list, progress towards goals, adherence, tolerance, and medication list, medication remains appropriate: Therapy remains appropriate (I attest)    Nimisha Campoverde, PharmD       [1]   Current Outpatient Medications   Medication Sig Dispense Refill    betamethasone, augmented, (Diprolene AF) 0.05 % cream       clobetasoL 0.05 % shampoo WASH SCALP ONCE daily let sit FOR 1-2 minutes BEFORE rinsing      risankizumab-rzaa (Skyrizi) 150 mg/mL pen injector pen " Inject 1 mL (150 mg) under the skin every 12 weeks. 1 mL 6    risankizumab-rzaa (Skyrizi) 150 mg/mL pen injector pen Inject 1 mL (150 mg) under the skin every 12 weeks. 1 mL 6     No current facility-administered medications for this visit.